# Patient Record
Sex: MALE | Race: WHITE | NOT HISPANIC OR LATINO | Employment: OTHER | URBAN - METROPOLITAN AREA
[De-identification: names, ages, dates, MRNs, and addresses within clinical notes are randomized per-mention and may not be internally consistent; named-entity substitution may affect disease eponyms.]

---

## 2019-02-28 ENCOUNTER — OFFICE VISIT (OUTPATIENT)
Dept: URGENT CARE | Facility: PHYSICIAN GROUP | Age: 44
End: 2019-02-28
Payer: COMMERCIAL

## 2019-02-28 VITALS
SYSTOLIC BLOOD PRESSURE: 128 MMHG | TEMPERATURE: 96.9 F | HEART RATE: 112 BPM | OXYGEN SATURATION: 94 % | WEIGHT: 204 LBS | DIASTOLIC BLOOD PRESSURE: 88 MMHG

## 2019-02-28 DIAGNOSIS — L03.114 CELLULITIS OF LEFT UPPER EXTREMITY: ICD-10-CM

## 2019-02-28 DIAGNOSIS — H60.501 ACUTE OTITIS EXTERNA OF RIGHT EAR, UNSPECIFIED TYPE: ICD-10-CM

## 2019-02-28 PROCEDURE — 99204 OFFICE O/P NEW MOD 45 MIN: CPT | Performed by: FAMILY MEDICINE

## 2019-02-28 RX ORDER — PREGABALIN 300 MG/1
300 CAPSULE ORAL DAILY
COMMUNITY
End: 2021-08-13 | Stop reason: SDUPTHER

## 2019-02-28 RX ORDER — AMOXICILLIN AND CLAVULANATE POTASSIUM 875; 125 MG/1; MG/1
1 TABLET, FILM COATED ORAL 2 TIMES DAILY
Qty: 14 TAB | Refills: 0 | Status: SHIPPED | OUTPATIENT
Start: 2019-02-28 | End: 2019-03-07

## 2019-02-28 RX ORDER — METHADONE HYDROCHLORIDE 10 MG/1
10 TABLET ORAL
COMMUNITY
End: 2021-08-13

## 2019-02-28 ASSESSMENT — ENCOUNTER SYMPTOMS
MYALGIAS: 0
EYE PAIN: 0
NUMBNESS: 0
DIZZINESS: 0
SORE THROAT: 0
TINGLING: 0
COUGH: 0
VERTIGO: 0
CHILLS: 0
FEVER: 0
SHORTNESS OF BREATH: 0
VOMITING: 0
NAUSEA: 0
MUSCLE WEAKNESS: 0

## 2019-02-28 NOTE — PATIENT INSTRUCTIONS
Otalgia  Otalgia is pain in or around the ear. When the pain is from the ear itself it is called primary otalgia. Pain may also be coming from somewhere else, like the head and neck. This is called secondary otalgia.   CAUSES   Causes of primary otalgia include:  · Middle ear infection.  · It can also be caused by injury to the ear or infection of the ear canal (swimmer's ear). Swimmer's ear causes pain, swelling and often drainage from the ear canal.  Causes of secondary otalgia include:  · Sinus infections.  · Allergies and colds that cause stuffiness of the nose and tubes that drain the ears (eustachian tubes).  · Dental problems like cavities, gum infections or teething.  · Sore Throat (tonsillitis and pharyngitis).  · Swollen glands in the neck.  · Infection of the bone behind the ear (mastoiditis).  · TMJ discomfort (problems with the joint between your jaw and your skull).  · Other problems such as nerve disorders, circulation problems, heart disease and tumors of the head and neck can also cause symptoms of ear pain. This is rare.  DIAGNOSIS   Evaluation, Diagnosis and Testing:  · Examination by your medical caregiver is recommended to evaluate and diagnose the cause of otalgia.  · Further testing or referral to a specialist may be indicated if the cause of the ear pain is not found and the symptom persists.  TREATMENT   · Your doctor may prescribe antibiotics if an ear infection is diagnosed.  · Pain relievers and topical analgesics may be recommended.  · It is important to take all medications as prescribed.  HOME CARE INSTRUCTIONS   · It may be helpful to sleep with the painful ear in the up position.  · A warm compress over the painful ear may provide relief.  · A soft diet and avoiding gum may help while ear pain is present.  SEEK IMMEDIATE MEDICAL CARE IF:  · You develop severe pain, a high fever, repeated vomiting or dehydration.  · You develop extreme dizziness, headache, confusion, ringing in the  ears (tinnitus) or hearing loss.  Document Released: 01/25/2006 Document Revised: 03/11/2013 Document Reviewed: 10/27/2010  [employer]® Patient Information ©2014 [employer], [employer].

## 2019-02-28 NOTE — PROGRESS NOTES
Subjective:     Aroldo Joseph is a 43 y.o. male who presents for Wrist Pain ((left) posterior redness and pain, no trauma. ) and Ear Fullness ((right) with bleeding since the day before yesterday. )       Wrist Pain    The incident occurred 3 to 5 days ago. There was no injury mechanism. The pain is present in the left wrist. The quality of the pain is described as aching. The pain does not radiate. The pain is mild. The pain has been constant since the incident. Pertinent negatives include no chest pain, muscle weakness, numbness or tingling.   Ear Fullness   This is a new problem. The current episode started yesterday. The problem occurs constantly. The problem has been waxing and waning. Pertinent negatives include no chest pain, chills, congestion, coughing, fever, myalgias, nausea, numbness, rash, sore throat, vertigo or vomiting.     Past Medical History:   Diagnosis Date   • Spinal cord injury, T7-T12 (HCC)      Past Surgical History:   Procedure Laterality Date   • LAMINOTOMY     • OPEN REDUCTION       Social History     Social History   • Marital status:      Spouse name: N/A   • Number of children: N/A   • Years of education: N/A     Occupational History   • Not on file.     Social History Main Topics   • Smoking status: Never Smoker   • Smokeless tobacco: Current User     Types: Chew   • Alcohol use No   • Drug use: No   • Sexual activity: Not on file     Other Topics Concern   • Not on file     Social History Narrative   • No narrative on file      Family History   Problem Relation Age of Onset   • Stroke Mother    • Stroke Maternal Grandfather    • Heart Disease Neg Hx     Review of Systems   Constitutional: Negative for chills and fever.   HENT: Positive for ear discharge, ear pain and hearing loss. Negative for congestion, nosebleeds and sore throat.    Eyes: Negative for pain.   Respiratory: Negative for cough and shortness of breath.    Cardiovascular: Negative for chest pain.      Gastrointestinal: Negative for nausea and vomiting.   Genitourinary: Negative for hematuria.   Musculoskeletal: Negative for myalgias.   Skin: Negative for rash.   Neurological: Negative for dizziness, vertigo, tingling and numbness.   No Known Allergies   Objective:   /88   Pulse (!) 112   Temp 36.1 °C (96.9 °F)   Wt 92.5 kg (204 lb)   SpO2 94%   Physical Exam   Constitutional: He is oriented to person, place, and time. He appears well-developed and well-nourished. No distress.   HENT:   Head: Normocephalic and atraumatic.   Right Ear: There is drainage, swelling and tenderness.   Eyes: Pupils are equal, round, and reactive to light. Conjunctivae and EOM are normal.   Cardiovascular: Normal rate and regular rhythm.    No murmur heard.  Pulmonary/Chest: Effort normal and breath sounds normal. No respiratory distress.   Abdominal: Soft. He exhibits no distension. There is no tenderness.   Neurological: He is alert and oriented to person, place, and time. He has normal reflexes. No sensory deficit.   Skin: Skin is warm and dry.        Psychiatric: He has a normal mood and affect.         Assessment/Plan:   Assessment    1. Acute otitis externa of right ear, unspecified type  - amoxicillin-clavulanate (AUGMENTIN) 875-125 MG Tab; Take 1 Tab by mouth 2 times a day for 7 days.  Dispense: 14 Tab; Refill: 0  - REFERRAL TO ENT    2. Cellulitis of left upper extremity    Other orders  - pregabalin (LYRICA) 300 MG capsule; Take 300 mg by mouth every day.  - methadone (DOLOPHINE) 10 MG Tab; Take 10 mg by mouth.  - Venlafaxine HCl (EFFEXOR XR PO); Take  by mouth.  - BACLOFEN PO; Take  by mouth.    Differential diagnosis, natural history, supportive care, and indications for immediate follow-up discussed.

## 2021-08-12 ENCOUNTER — TELEPHONE (OUTPATIENT)
Dept: SCHEDULING | Facility: IMAGING CENTER | Age: 46
End: 2021-08-12

## 2021-08-13 ENCOUNTER — OFFICE VISIT (OUTPATIENT)
Dept: MEDICAL GROUP | Facility: PHYSICIAN GROUP | Age: 46
End: 2021-08-13
Payer: COMMERCIAL

## 2021-08-13 VITALS
SYSTOLIC BLOOD PRESSURE: 102 MMHG | HEIGHT: 68 IN | DIASTOLIC BLOOD PRESSURE: 68 MMHG | BODY MASS INDEX: 28.82 KG/M2 | TEMPERATURE: 98.1 F | WEIGHT: 190.2 LBS | HEART RATE: 77 BPM | OXYGEN SATURATION: 95 %

## 2021-08-13 DIAGNOSIS — G56.21 ULNAR NEUROPATHY OF RIGHT UPPER EXTREMITY: ICD-10-CM

## 2021-08-13 DIAGNOSIS — G89.29 OTHER CHRONIC PAIN: ICD-10-CM

## 2021-08-13 DIAGNOSIS — Z87.81 HISTORY OF TIBIAL FRACTURE: ICD-10-CM

## 2021-08-13 DIAGNOSIS — Z13.220 LIPID SCREENING: ICD-10-CM

## 2021-08-13 DIAGNOSIS — R33.9 RETENTION OF URINE: ICD-10-CM

## 2021-08-13 DIAGNOSIS — K62.3 RECTAL PROLAPSE: ICD-10-CM

## 2021-08-13 DIAGNOSIS — M21.372 FOOT DROP, BILATERAL: ICD-10-CM

## 2021-08-13 DIAGNOSIS — Z00.00 HEALTH CARE MAINTENANCE: ICD-10-CM

## 2021-08-13 DIAGNOSIS — N31.9 NEUROGENIC BLADDER: ICD-10-CM

## 2021-08-13 DIAGNOSIS — G83.4 CAUDA EQUINA SYNDROME (HCC): ICD-10-CM

## 2021-08-13 DIAGNOSIS — K59.2 NEUROGENIC BOWEL: ICD-10-CM
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
## 2021-08-13 DIAGNOSIS — F17.210 CIGARETTE NICOTINE DEPENDENCE, UNCOMPLICATED: ICD-10-CM

## 2021-08-13 DIAGNOSIS — M21.371 FOOT DROP, BILATERAL: ICD-10-CM

## 2021-08-13 DIAGNOSIS — S32.018S OTHER CLOSED FRACTURE OF FIRST LUMBAR VERTEBRA, SEQUELA: ICD-10-CM

## 2021-08-13 DIAGNOSIS — Z90.49 S/P CHOLECYSTECTOMY: ICD-10-CM

## 2021-08-13 DIAGNOSIS — Z13.1 DIABETES MELLITUS SCREENING: ICD-10-CM

## 2021-08-13 DIAGNOSIS — M62.838 MUSCLE SPASTICITY: ICD-10-CM

## 2021-08-13 DIAGNOSIS — S24.104A: ICD-10-CM

## 2021-08-13 PROBLEM — Z82.0 FAMILY HISTORY OF HUNTINGTON'S DISEASE: Status: ACTIVE | Noted: 2019-12-06

## 2021-08-13 PROBLEM — K11.7 XEROSTOMIA: Status: ACTIVE | Noted: 2020-02-12

## 2021-08-13 PROBLEM — F33.1 MODERATE EPISODE OF RECURRENT MAJOR DEPRESSIVE DISORDER (HCC): Status: ACTIVE | Noted: 2018-03-22

## 2021-08-13 PROBLEM — S83.511A RUPTURE OF ANTERIOR CRUCIATE LIGAMENT OF RIGHT KNEE: Status: ACTIVE | Noted: 2017-10-25

## 2021-08-13 PROCEDURE — 99215 OFFICE O/P EST HI 40 MIN: CPT | Performed by: INTERNAL MEDICINE

## 2021-08-13 RX ORDER — OXYCODONE AND ACETAMINOPHEN 10; 325 MG/1; MG/1
TABLET ORAL
COMMUNITY
Start: 2021-07-12 | End: 2021-08-13 | Stop reason: SDUPTHER

## 2021-08-13 RX ORDER — FLUOXETINE HYDROCHLORIDE 20 MG/1
20 CAPSULE ORAL
COMMUNITY
Start: 2021-06-13 | End: 2021-09-09 | Stop reason: SDUPTHER

## 2021-08-13 RX ORDER — OXYCODONE AND ACETAMINOPHEN 10; 325 MG/1; MG/1
1 TABLET ORAL
COMMUNITY
Start: 2021-06-09 | End: 2021-08-13

## 2021-08-13 RX ORDER — LISINOPRIL 10 MG/1
10 TABLET ORAL
COMMUNITY
Start: 2021-06-10 | End: 2021-08-13

## 2021-08-13 RX ORDER — METHADONE HYDROCHLORIDE 5 MG/1
5 TABLET ORAL EVERY 8 HOURS PRN
Qty: 60 TABLET | Refills: 0 | Status: SHIPPED | OUTPATIENT
Start: 2021-08-13 | End: 2021-08-13 | Stop reason: SDUPTHER

## 2021-08-13 RX ORDER — GABAPENTIN 300 MG/1
300 CAPSULE ORAL 3 TIMES DAILY
COMMUNITY
End: 2022-01-17

## 2021-08-13 RX ORDER — ALPRAZOLAM 1 MG/1
1 TABLET ORAL
COMMUNITY
Start: 2021-03-10 | End: 2022-01-17

## 2021-08-13 RX ORDER — LISINOPRIL 10 MG/1
10 TABLET ORAL DAILY
COMMUNITY
Start: 2021-06-09 | End: 2021-11-16 | Stop reason: SDUPTHER

## 2021-08-13 RX ORDER — BACLOFEN 10 MG/1
TABLET ORAL
COMMUNITY
Start: 2021-08-09 | End: 2022-01-17

## 2021-08-13 RX ORDER — METHADONE HYDROCHLORIDE 5 MG/1
TABLET ORAL
COMMUNITY
Start: 2021-07-12 | End: 2021-08-13 | Stop reason: SDUPTHER

## 2021-08-13 RX ORDER — DIAZEPAM 10 MG/1
10 TABLET ORAL EVERY 8 HOURS PRN
COMMUNITY
Start: 2021-06-09 | End: 2022-01-17

## 2021-08-13 RX ORDER — METHADONE HYDROCHLORIDE 5 MG/1
5 TABLET ORAL 2 TIMES DAILY PRN
Qty: 60 TABLET | Refills: 0 | Status: SHIPPED | OUTPATIENT
Start: 2021-08-13 | End: 2021-09-09 | Stop reason: SDUPTHER

## 2021-08-13 RX ORDER — OXYCODONE AND ACETAMINOPHEN 10; 325 MG/1; MG/1
TABLET ORAL
Qty: 120 TABLET | Refills: 0 | Status: SHIPPED | OUTPATIENT
Start: 2021-08-13 | End: 2021-09-09 | Stop reason: SDUPTHER

## 2021-08-13 RX ORDER — SODIUM, POTASSIUM,MAG SULFATES 17.5-3.13G
177 SOLUTION, RECONSTITUTED, ORAL ORAL
COMMUNITY
Start: 2021-04-07 | End: 2021-08-13

## 2021-08-13 RX ORDER — PREGABALIN 300 MG/1
300 CAPSULE ORAL 2 TIMES DAILY
Qty: 60 CAPSULE | Refills: 0 | Status: SHIPPED | OUTPATIENT
Start: 2021-08-13 | End: 2021-09-09 | Stop reason: SDUPTHER

## 2021-08-13 RX ORDER — DIAZEPAM 10 MG/1
10 TABLET ORAL
COMMUNITY
Start: 2021-06-09 | End: 2021-08-13

## 2021-08-13 ASSESSMENT — PATIENT HEALTH QUESTIONNAIRE - PHQ9: CLINICAL INTERPRETATION OF PHQ2 SCORE: 0

## 2021-08-13 NOTE — PROGRESS NOTES
New Patient to establish    Chief Complaint   Patient presents with   • Establish Care   • Medication Refill     precocet, methadone, lyrica, prozac       Subjective:     History of Present Illness: Patient is a 45 y.o. male who is here today to establish primary care      5. Cigarette nicotine dependence, uncomplicated  Reported chewing 1 can every 2 days, for 25 to 30 years,    4. Cauda equina syndrome (HCC)  6. Other closed fracture of first lumbar vertebra, sequela  7. Foot drop, bilateral  8. Muscle spasticity  9. Neurogenic bladder  10. Neurogenic bowel  11. Other chronic pain  12. Rectal prolapse  13. Retention of urine  14. History of tibial fracture  15. Ulnar neuropathy of right upper extremity  16. Unspecified injury at t11-T12 level of thoracic spinal cord, initial encounter ([facility])  17. Work related injury  Per chart review report: The patient underwent a  combination of internal and external fixation of the wrists bilaterally, internal fixation of the spine including thoracic and lumbar spine, external and internal fixation of the pelvis, internal fixation of the right tibia.    >> Reported he fell off from ladder 39 feet with injury to multiple part of the body and fractures back in 2001  -Complicated with the above injuries including bowel and bladder incontinence, reported self cath 3 times day as well as evacuation of the rectum by fingers, reported constipation symptom alternating with diarrhea, sometimes he has leaky fecal material from fecal impaction as well, chronic constipation resulted in rectal prolapse  >> He was seen by GI for colonoscopy in [place] as well    >> Patient seen by provider from [facility], work compensation provider, he is receiving Lyrica 300 mg twice daily, oxycodone 10 mg 4 times daily, methadone 5 mg twice daily, gabapentin as needed, diazepam 10 mg to 10 pills in a month alternating with Xanax 1 mg 0 to 10 pills in a month, reported he is not taking both benzo at the  same time, also baclofen 30 mg 3 times daily for muscle spasm    >> Reported he is tired of commuting to his work compensation in [place] and would like to establish with primary care, reported he never had labs for years  -Reported chronic pain, severity score from 4-9, usually elevated by the above opioid and muscle spasm medication, patient is able to work currently and able to perform his ADL S    >> Patient has sequelae from his fracture including left lower extremity spasticity and weakness more than the right side, also left upper extremity spasticity and weakness more than the right side    Current Outpatient Medications on File Prior to Visit   Medication Sig Dispense Refill   • ALPRAZolam (XANAX) 1 MG Tab Take 1 mg by mouth.     • baclofen (LIORESAL) 10 MG Tab      • diazepam (VALIUM) 10 MG tablet Take 10 mg by mouth every 8 hours as needed. ANXIETY     • FLUoxetine (PROZAC) 20 MG Cap Take 20 mg by mouth.     • lisinopril (PRINIVIL) 10 MG Tab Take 10 mg by mouth every day.     • gabapentin (NEURONTIN) 300 MG Cap Take 300 mg by mouth 3 times a day.       No current facility-administered medications on file prior to visit.     Allergies   Allergen Reactions   • Methocarbamol Unspecified     nightmares  nightmares     • Zolpidem Unspecified     nightmars  nightmars       Patient Active Problem List    Diagnosis Date Noted   • History of tibial fracture 08/13/2021   • Xerostomia 02/12/2020   • Family history of [place]'s disease 12/06/2019   • Rectal prolapse 11/30/2019   • Work related injury 11/08/2018   • Moderate episode of recurrent major depressive disorder (HCC) 03/22/2018   • Cigarette nicotine dependence, uncomplicated 11/21/2017   • Rupture of anterior cruciate ligament of right knee 10/25/2017   • UTI (urinary tract infection) 07/24/2014   • Cauda equina syndrome (HCC) 09/21/2012   • Closed fracture of first lumbar vertebra (HCC) 09/21/2012   • Diaphoresis 09/21/2012   • Foot drop, bilateral  "09/21/2012   • Muscle spasticity 09/21/2012   • Neurogenic bladder 09/21/2012   • Neurogenic bowel 09/21/2012   • Osteopenia 09/21/2012   • Other chronic pain 09/21/2012   • Retention of urine 09/21/2012   • Ulnar neuropathy 09/21/2012   • Unspecified injury at t11-T12 level of thoracic spinal cord, initial encounter ([facility]) 09/21/2012     Past Medical History:   Diagnosis Date   • Spinal cord injury, T7-T12 ([facility])    • Tibia fracture 9/21/2012     Past Surgical History:   Procedure Laterality Date   • CHOLECYSTECTOMY     • LAMINOTOMY     • OPEN REDUCTION       Family History   Problem Relation Age of Onset   • Stroke Mother    • Stroke Maternal Grandfather    • Diabetes Father    • Heart Disease Neg Hx      Social History     Tobacco Use   • Smoking status: Never Smoker   • Smokeless tobacco: Current User     Types: Chew   Substance Use Topics   • Alcohol use: No   • Drug use: No     ROS:     - Constitutional: Negative for fever, chills,    - Eye: Negative for blurry vision    -ENT: Negative for ear pain    - Respiratory: Negative for cough, hemoptysis    - Cardiovascular: Negative for chest pain     - Gastrointestinal: Negative for abdominal pain    - Genitourinary: Negative for dysuria    - Musculoskeletal: Negative for joint swelling    - Skin: Negative for itching    - Neurological: Negative for focal weakness     - Psychiatric/Behavioral: Negative for depression        Physical Exam:     /68 (BP Location: Right arm, Patient Position: Sitting, BP Cuff Size: Adult)   Pulse 77   Temp 36.7 °C (98.1 °F) (Temporal)   Ht 1.727 m (5' 8\")   Wt 86.3 kg (190 lb 3.2 oz)   SpO2 95%   BMI 28.92 kg/m²   General: Normal appearing. No distress.  ENT: oropharynx without exudates.    Eyes: conjunctiva clear lids without ptosis  Pulmonary: Clear to ausculation.  Normal effort.   Cardiovascular: Regular rate and rhythm  Abdomen: Soft, nontender,  Lymph: No cervical or supraclavicular palpable lymph nodes  Psych: Normal " mood and affect.     Assessment and Plan:     1. Health care maintenance  4. Cauda equina syndrome (HCC)  6. Other closed fracture of first lumbar vertebra, sequela  7. Foot drop, bilateral  8. Muscle spasticity  9. Neurogenic bladder  10. Neurogenic bowel  11. Other chronic pain  12. Rectal prolapse  13. Retention of urine  14. History of tibial fracture  15. Ulnar neuropathy of right upper extremity  16. Unspecified injury at t11-T12 level of thoracic spinal cord, initial encounter ([facility])  17. Work related injury  - REFERRAL TO OUTPATIENT INTERVENTIONAL PAIN CLINIC  - pregabalin (LYRICA) 300 MG capsule; Take 1 Capsule by mouth 2 times a day for 30 days.  Dispense: 60 Capsule; Refill: 0  - oxyCODONE-acetaminophen (PERCOCET-10)  MG Tab; TAKE ONE OR TWO TABLETS BY MOUTH EVERY FOUR HOURS AS NEEDED FOR SEVERE PAIN FOR 30 DAYS. MAX DAILY AMOUNT 12 TABLETS  Dispense: 120 Tablet; Refill: 0  - methadone (DOLOPHINE) 5 MG Tab; Take 1 Tablet by mouth bid as needed for Moderate Pain for up to 30 days.  Dispense: 60 Tablet; Refill: 0  - CBC WITH DIFFERENTIAL; Future  - Comp Metabolic Panel; Future  - CRP HIGH SENSITIVE (CARDIAC); Future  - MAGNESIUM, RBC  - VITAMIN D,25 HYDROXY; Future  - VITAMIN B12; Future  - TSH WITH REFLEX TO FT4; Future  - THYROID PEROXIDASE  (TPO) AB; Future  - Pain Management Screen; Future  - Controlled Substance Treatment Agreement    >> Not sure how much of constipation contributed to opioid versus a spinal cord injury as well    - Pain Management Screen; Future  - Controlled Substance Treatment Agreement  >>  checked, no risk for abuse, patient understand the risk factors and complication including even death from  overdosing  -Urine drug screen ordered  -ORT score2    2. Diabetes mellitus screening  - HEMOGLOBIN A1C; Future  - INSULIN FASTING; Future    3. Lipid screening  - NMR LIPOPROFILE    Follow Up:      Return in about 4 weeks (around 9/10/2021).  Labs,  Please note that this dictation  was created using voice recognition software. I have made every reasonable attempt to correct obvious errors, but I expect that there are errors of grammar and possibly content that I did not discover before finalizing the note.    Signed by: Guillermo Li M.D.

## 2021-08-13 NOTE — PROGRESS NOTES
The patient underwent a  combination of internal and external fixation of the wrists, internal  fixation of the spine, external and internal fixation of the pelvis,  internal fixation of the right tibia.

## 2021-08-23 ENCOUNTER — HOSPITAL ENCOUNTER (OUTPATIENT)
Dept: LAB | Facility: MEDICAL CENTER | Age: 46
End: 2021-08-23
Attending: INTERNAL MEDICINE
Payer: COMMERCIAL

## 2021-08-23 PROCEDURE — 36415 COLL VENOUS BLD VENIPUNCTURE: CPT

## 2021-08-23 PROCEDURE — 80061 LIPID PANEL: CPT

## 2021-08-23 PROCEDURE — 83735 ASSAY OF MAGNESIUM: CPT

## 2021-08-23 PROCEDURE — 83704 LIPOPROTEIN BLD QUAN PART: CPT

## 2021-08-25 LAB — MAGNESIUM RBC-SCNC: 6.3 MG/DL (ref 3.6–7.5)

## 2021-08-26 LAB
CHOLEST SERPL-MCNC: 221 MG/DL
FASTING STATUS PATIENT QL REPORTED: NORMAL
HDL PARTICAL NO Q4363: 28.1 UMOL/L
HDL SIZE Q4361: 8.4 NM
HDLC SERPL-MCNC: 43 MG/DL (ref 40–59)
HLD.LARGE SERPL-SCNC: <2.8 UMOL/L
L VLDL PART NO Q4357: 2.5 NMOL/L
LDL SERPL QN: 20.8 NM
LDL SERPL-SCNC: 1773 NMOL/L
LDL SMALL SERPL-SCNC: 939 NMOL/L
LDLC SERPL CALC-MCNC: 158 MG/DL
PATHOLOGY STUDY: ABNORMAL
TRIGL SERPL-MCNC: 98 MG/DL (ref 30–149)
VLDL SIZE Q4362: 46.1 NM

## 2021-08-31 ENCOUNTER — OFFICE VISIT (OUTPATIENT)
Dept: PHYSICAL MEDICINE AND REHAB | Facility: REHABILITATION | Age: 46
End: 2021-08-31
Payer: COMMERCIAL

## 2021-08-31 VITALS
RESPIRATION RATE: 18 BRPM | TEMPERATURE: 97.8 F | OXYGEN SATURATION: 95 % | SYSTOLIC BLOOD PRESSURE: 112 MMHG | HEART RATE: 70 BPM | DIASTOLIC BLOOD PRESSURE: 60 MMHG

## 2021-08-31 DIAGNOSIS — M79.2 NEUROPATHIC PAIN: ICD-10-CM

## 2021-08-31 DIAGNOSIS — S32.018S OTHER CLOSED FRACTURE OF FIRST LUMBAR VERTEBRA, SEQUELA: ICD-10-CM

## 2021-08-31 DIAGNOSIS — N31.9 NEUROGENIC BLADDER: ICD-10-CM

## 2021-08-31 DIAGNOSIS — G95.81 CONUS MEDULLARIS SYNDROME (HCC): Primary | ICD-10-CM

## 2021-08-31 DIAGNOSIS — K59.2 NEUROGENIC BOWEL: ICD-10-CM

## 2021-08-31 DIAGNOSIS — G89.29 CHRONIC NOCICEPTIVE PAIN: ICD-10-CM

## 2021-08-31 PROCEDURE — 99204 OFFICE O/P NEW MOD 45 MIN: CPT | Performed by: PHYSICAL MEDICINE & REHABILITATION

## 2021-08-31 NOTE — PROGRESS NOTES
[facility]  [facility]  [address] [place] [address]  Ph: [phone]    NEW PATIENT EVALUATION    Patient Name: Aroldo Joseph   Patient : 1975  Patient Age: 45 y.o.     Examining Physician: Dr. Silvana Angel,     PM&R History to Date - Background Information:  Dates of Admission/Discharge to ARU: 3/15/2005-2005    SUBJECTIVE:   Patient Identification: Aroldo Joseph is a 45 y.o. male with PMH significant for HTN and rehabilitation history significant for traumatic thoracic spinal cord injury / L1 burst fracture s/p fixation  and is presenting to PM&R clinic for a NEW OUTPATIENT evaluation with the following chief complaint/s:    Chief Complaint: SCI    History of Present Illness:   - Records reviewed: Acute rehab discharge summary reviewed in its entirety.  At the time the patient was 29 years old when he suffered his spinal cord injury.  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  He was transported to [facility] for evaluation and treatment.  Had multiple surgical interventions for fractures.  Did have fusion of L1 compression fracture and subsequent spinal cord injury.  Placed in [facility]O. Did develop pressure ulcer and underwent skin flap by Dr. Navarrete at [facility].  Was able to get rehab without walls outpatient.  - Records reviewed: General surgery office visit dated 2021, Dr. Arriaga.  Patient self catheterizes and uses digital stimulation.  Has had some prolapse of the rectum.  - Appears patient has gotten most of his health care needs evaluated and treated through [facility]  - Continues to follow up with [facility] in [place].   - Still getting medications through them.   - Has workmans comp  and working on closing out his case.   - Function: Ambulatory without AD. Gets spasticity and cramping. Actively working 5 hrs per day. Stands and runs drill press. Had bad foot drop on the right. Sometimes catches it on the  ground and trips. Not using AFO now.   - Spasticity: Takes Baclofen 30mg TID. Takes Valium 10mg PRN.   - Neuropathic Pain: On Lyrica 300mg BID + Gabapentin 300mg PRN (uses once in a while at night time)  - Nociceptive Pain: On Methadone 5mg BID for general pain + Percocet  QID.    - Bladder: Self catheterizes. Hasnt seen Urology in years.   - Bowel: Has had loose stools since colonoscopy. Usually does DS BID. Has been taking probiotic.   - Percocet has been reduced over the years as well as the Methadone.     Review of Systems:  All other pertinent positive review of systems are noted above in HPI.   All other systems reviewed and are negative.    Past Medical History:  Past Medical History:   Diagnosis Date   • Tibia fracture 9/21/2012   • Spinal cord injury, T7-T12 (HCC)       Past Surgical History:   Procedure Laterality Date   • CHOLECYSTECTOMY     • LAMINOTOMY     • OPEN REDUCTION          Current Outpatient Medications:   •  ALPRAZolam (XANAX) 1 MG Tab, Take 1 mg by mouth., Disp: , Rfl:   •  baclofen (LIORESAL) 10 MG Tab, , Disp: , Rfl:   •  diazepam (VALIUM) 10 MG tablet, Take 10 mg by mouth every 8 hours as needed. ANXIETY, Disp: , Rfl:   •  FLUoxetine (PROZAC) 20 MG Cap, Take 20 mg by mouth., Disp: , Rfl:   •  lisinopril (PRINIVIL) 10 MG Tab, Take 10 mg by mouth every day., Disp: , Rfl:   •  gabapentin (NEURONTIN) 300 MG Cap, Take 300 mg by mouth 3 times a day., Disp: , Rfl:   •  pregabalin (LYRICA) 300 MG capsule, Take 1 Capsule by mouth 2 times a day for 30 days., Disp: 60 Capsule, Rfl: 0  •  oxyCODONE-acetaminophen (PERCOCET-10)  MG Tab, TAKE ONE OR TWO TABLETS BY MOUTH EVERY FOUR HOURS AS NEEDED FOR SEVERE PAIN FOR 30 DAYS. MAX DAILY AMOUNT 12 TABLETS, Disp: 120 Tablet, Rfl: 0  •  methadone (DOLOPHINE) 5 MG Tab, Take 1 Tablet by mouth 2 times a day as needed for Moderate Pain for up to 30 days., Disp: 60 Tablet, Rfl: 0  Allergies   Allergen Reactions   • Methocarbamol Unspecified      nightmares  nightmares     • Zolpidem Unspecified     nightmars  nightmars          Past Social History:  Social History     Socioeconomic History   • Marital status:      Spouse name: Not on file   • Number of children: Not on file   • Years of education: Not on file   • Highest education level: Not on file   Occupational History   • Not on file   Tobacco Use   • Smoking status: Never Smoker   • Smokeless tobacco: Current User     Types: Chew   Substance and Sexual Activity   • Alcohol use: No   • Drug use: No   • Sexual activity: Not on file   Other Topics Concern   • Not on file   Social History Narrative   • Not on file     Social Determinants of Health     Financial Resource Strain:    • Difficulty of Paying Living Expenses:    Food Insecurity:    • Worried About Running Out of Food in the Last Year:    • Ran Out of Food in the Last Year:    Transportation Needs:    • Lack of Transportation (Medical):    • Lack of Transportation (Non-Medical):    Physical Activity:    • Days of Exercise per Week:    • Minutes of Exercise per Session:    Stress:    • Feeling of Stress :    Social Connections:    • Frequency of Communication with Friends and Family:    • Frequency of Social Gatherings with Friends and Family:    • Attends Mandaen Services:    • Active Member of Clubs or Organizations:    • Attends Club or Organization Meetings:    • Marital Status:    Intimate Partner Violence:    • Fear of Current or Ex-Partner:    • Emotionally Abused:    • Physically Abused:    • Sexually Abused:         Family History:  Family History   Problem Relation Age of Onset   • Stroke Mother    • Stroke Maternal Grandfather    • Diabetes Father    • Heart Disease Neg Hx        Depression and Opioid Screening  PHQ-9:  Depression Screen (PHQ-2/PHQ-9) 8/13/2021   PHQ-2 Total Score 0     Interpretation of PHQ-9 Total Score   Score Severity   1-4 No Depression   5-9 Mild Depression   10-14 Moderate Depression   15-19 Moderately  Severe Depression   20-27 Severe Depression     OBJECTIVE:   Vital Signs:  Vitals:    08/31/21 0811   BP: 112/60   Pulse: 70   Resp: 18   Temp: 36.6 °C (97.8 °F)   SpO2: 95%        Pertinent Labs:  No results found for: SODIUM, POTASSIUM, CHLORIDE, CO2, GLUCOSE, BUN, CREATININE, BUNCREATRAT, GLOMRATE    No results found for: HBA1C    Lab Results   Component Value Date/Time    WBC 6.8 03/22/2006 04:15 AM    RBC 4.04 (L) 03/22/2006 04:15 AM    HEMOGLOBIN 12.2 (L) 03/22/2006 04:15 AM    HEMATOCRIT 33.5 (L) 03/22/2006 04:15 AM    MCV 83.1 03/22/2006 04:15 AM    MCH 30.1 03/22/2006 04:15 AM    MCHC 36.2 (H) 03/22/2006 04:15 AM    MPV 7.8 03/22/2006 04:15 AM       No results found for: ASTSGOT, ALTSGPT     Physical Exam:   GEN: No apparent distress  HEENT: Head normocephalic, atraumatic.  Sclera nonicteric bilaterally, no ocular discharge appreciated bilaterally.  CV: Extremities warm and well-perfused, no peripheral edema appreciated bilaterally.  PULMONARY: Breathing nonlabored on room air, no respiratory accessory muscle use.  Not requiring supplemental oxygen.  SKIN: No appreciable skin breakdown on exposed areas of skin.  PSYCH: Mood and affect within normal limits.  NEURO: Awake alert.  Conversational.  Logical thought content.    Motor Exam Lower Extremities  ? Myotome R L   Hip flexion L2 5 5   Knee extension L3 5 5   Ankle dorsiflexion L4 5 5   Toe extension L5 5 5   Ankle plantarflexion S1 5 5     No significant spasticity on passive modified Haley scale exam.  No clonus bilateral ankles  Mildly spastic gait affecting the right side more than the left.  Ambulatory without assistive device    ASSESSMENT/PLAN: Aroldo Joseph  is a 45 y.o. male with rehabilitation history significant for traumatic thoracic spinal cord injury 2/2 L1 burst fracture s/p fixation 2005 in [place], here for evaluation. The following plan was discussed with the patient who is in agreement.     Visit Diagnoses     ICD-10-CM    1. 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 Other closed fracture of first lumbar vertebra, sequela  S32.018S   3. Neurogenic bladder  N31.9   4. Neurogenic bowel  K59.2   5. Chronic nociceptive pain  G89.29   6. Neuropathic pain  M79.2        Rehab/Neuro:   1. Traumatic thoracic spinal cord injury 2/2 L1 burst fracture s/p fixation 2005 in [place]  2. Conus medullary syndrome (more likely based on clinical presentation) vs DIANA  -Records reviewed as aforementioned in the HPI.  -Therapy: No outpatient therapy required at this time  -Exercise: Recommend self implemented exercise program as tolerated.  -Occupation: Runs a [employer] 5 hours/day, approximately 20-30 hours/week  -Currently working on [employer] case.  Primary treating physician is currently in [place].  -Function: Overall has made a good recovery physically.  Does have some spasticity which is currently managed.  Has been able to return to work.  Ambulatory without assistive device.    Spasticity: Currently well controlled.  -Med management: Continue baclofen 30 mg 3 times daily  -Med management: Continue Valium 10 mg 3 times daily as needed.    Neuropathic Pain: Present, currently controlled.  -Med management: Continue Lyrica 300 mg twice daily  -Med management: Continue gabapentin 300 mg as needed (patient usually takes 300-600 mg at night if has additional pain)    Neurogenic Bladder: Managed with [facility].  Gets 2-3 UTIs per year.  -Imaging: RBUS for renal/bladder health in setting of chronic neurogenic bladder and intermittent catheterization  -Labs: Patient already has CMP ordered by PCP which for whatever reason was not drawn when he went to draw his labs 8/25/2021.  Counseled to follow-up with lab as the orders are there and active.  -High risk management: Inappropriate management of neurogenic bladder can result in hydroureter/hydronephrosis, renal failure, and death.    Neurogenic Bowel: Managed with digital stimulation twice  daily.  Recently had colonoscopy which has caused him to have some persistent diarrhea.  Otherwise bowels have been managed stably with digital stimulation twice daily.  Has rectal prolapse.  Seeing gen surg, Dr. Mac.  No surgical intervention at this time.  -Continue bowel program as is.  -Counseled to continue probiotic for now and try adding fiber to diet temporarily to bulk in stool to prevent incontinence from loose stools.    Endo: Would benefit from vitamin D screen.  -Labs: Vitamin D ordered by PCP which has not yet been drawn.  Patient to follow-up with lab.    Mood:  -Med management: Continue Prozac 20 mg daily.    Sleep: Sometimes impeded by neuropathic pain and nociceptive pain adequately managed by current [place]ian regimen.    Nociceptive Pain: Multiple orthopedic issues during the time of his injury in 2005.  -Med management: Currently on methadone 5 mg twice daily (has been weaned over the years significantly)  -Med management: Continue Percocet 10 mg 4 times daily.  -The patient has been very stable on the aforementioned medication management regimen for a long time.  Does not wish to change his medications in any way.  -Referral: Pain management for evaluation of taking over patient's prescriptions given that he lives in [place] and does not want to have to drive to [place] monthly given that his Workmans comp case will hopefully soon be closed.      Follow up: 2-month follow-up for imaging, pain management referral    Thank you to the referring practitioner for the ability to assist with co-management of this patient. If there are any questions or concerns, please do not hesitate to contact me.     Total time spent was 45 minutes.  Included in this time is the time spent preparing for the visit including record review, my exam and evaluation, counseling and education regarding that which is aforementioned in the assessment and plan. Time was spent ordering the appropriate labs, tests, procedures,  referrals, medications. Discussion involved the patient.     Please note that this dictation was created using voice recognition software. I have made every reasonable attempt to correct obvious errors but there may be errors of grammar and content that I may have overlooked prior to finalization of this note.    Dr. Silvana Angel DO, MS  Department of Physical Medicine & Rehabilitation  Neuro Rehabilitation Clinic  [facility]

## 2021-09-09 ENCOUNTER — OFFICE VISIT (OUTPATIENT)
Dept: MEDICAL GROUP | Facility: PHYSICIAN GROUP | Age: 46
End: 2021-09-09
Payer: COMMERCIAL

## 2021-09-09 VITALS
DIASTOLIC BLOOD PRESSURE: 74 MMHG | HEART RATE: 86 BPM | TEMPERATURE: 96.8 F | SYSTOLIC BLOOD PRESSURE: 118 MMHG | OXYGEN SATURATION: 95 % | HEIGHT: 68 IN | WEIGHT: 187.9 LBS | BODY MASS INDEX: 28.48 KG/M2

## 2021-09-09 DIAGNOSIS — M62.838 MUSCLE SPASTICITY: ICD-10-CM

## 2021-09-09 DIAGNOSIS — Z00.00 HEALTH CARE MAINTENANCE: ICD-10-CM

## 2021-09-09 DIAGNOSIS — K59.2 NEUROGENIC BOWEL: ICD-10-CM

## 2021-09-09 DIAGNOSIS — F33.1 MODERATE EPISODE OF RECURRENT MAJOR DEPRESSIVE DISORDER (HCC): ICD-10-CM
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
## 2021-09-09 DIAGNOSIS — G56.21 ULNAR NEUROPATHY OF RIGHT UPPER EXTREMITY: ICD-10-CM

## 2021-09-09 DIAGNOSIS — G83.4 CAUDA EQUINA SYNDROME (HCC): ICD-10-CM

## 2021-09-09 DIAGNOSIS — Z87.81 HISTORY OF TIBIAL FRACTURE: ICD-10-CM

## 2021-09-09 DIAGNOSIS — S32.018S OTHER CLOSED FRACTURE OF FIRST LUMBAR VERTEBRA, SEQUELA: ICD-10-CM

## 2021-09-09 DIAGNOSIS — Z82.0 FAMILY HISTORY OF HUNTINGTON'S DISEASE: ICD-10-CM

## 2021-09-09 DIAGNOSIS — N39.0 URINARY TRACT INFECTION WITHOUT HEMATURIA, SITE UNSPECIFIED: ICD-10-CM

## 2021-09-09 DIAGNOSIS — M21.371 FOOT DROP, BILATERAL: ICD-10-CM

## 2021-09-09 DIAGNOSIS — S24.104A: ICD-10-CM

## 2021-09-09 DIAGNOSIS — M21.372 FOOT DROP, BILATERAL: ICD-10-CM

## 2021-09-09 DIAGNOSIS — K62.3 RECTAL PROLAPSE: ICD-10-CM

## 2021-09-09 DIAGNOSIS — R33.9 RETENTION OF URINE: ICD-10-CM

## 2021-09-09 DIAGNOSIS — G89.29 OTHER CHRONIC PAIN: ICD-10-CM

## 2021-09-09 DIAGNOSIS — N31.9 NEUROGENIC BLADDER: ICD-10-CM

## 2021-09-09 DIAGNOSIS — F17.210 CIGARETTE NICOTINE DEPENDENCE, UNCOMPLICATED: ICD-10-CM

## 2021-09-09 PROCEDURE — 99214 OFFICE O/P EST MOD 30 MIN: CPT | Performed by: INTERNAL MEDICINE

## 2021-09-09 RX ORDER — OXYCODONE AND ACETAMINOPHEN 10; 325 MG/1; MG/1
TABLET ORAL
Qty: 120 TABLET | Refills: 0 | Status: SHIPPED | OUTPATIENT
Start: 2021-09-12 | End: 2021-10-10

## 2021-09-09 RX ORDER — FLUOXETINE HYDROCHLORIDE 20 MG/1
20 CAPSULE ORAL DAILY
Qty: 90 CAPSULE | Refills: 1 | Status: SHIPPED | OUTPATIENT
Start: 2021-09-09 | End: 2021-11-16 | Stop reason: SDUPTHER

## 2021-09-09 RX ORDER — PREGABALIN 300 MG/1
300 CAPSULE ORAL 2 TIMES DAILY
Qty: 60 CAPSULE | Refills: 0 | Status: SHIPPED | OUTPATIENT
Start: 2021-09-12 | End: 2021-10-12

## 2021-09-09 RX ORDER — METHADONE HYDROCHLORIDE 5 MG/1
5 TABLET ORAL 2 TIMES DAILY PRN
Qty: 60 TABLET | Refills: 0 | Status: SHIPPED | OUTPATIENT
Start: 2021-09-12 | End: 2021-10-12

## 2021-09-09 NOTE — PROGRESS NOTES
Established Patient    Chief Complaint   Patient presents with   • Follow-Up   • Medication Refill       Subjective:     HPI:     >> Patient was seen by physiatrist who recommended to be seen by pain management for refilling methadone as well as other opiate and pain medication such as Lyrica and benzo    >> Also physiatrist ordered ultrasound renal for checking bladder/kidney secondary to chronic urine incontinence    >> Patient lab only his cholesterol profile other labs have not done secondary to not providing papers for labs      Patient Active Problem List    Diagnosis Date Noted   • History of tibial fracture 08/13/2021   • S/P cholecystectomy 08/13/2021   • Xerostomia 02/12/2020   • Family history of Micaela's disease 12/06/2019   • Rectal prolapse 11/30/2019   • Work related injury 11/08/2018   • Moderate episode of recurrent major depressive disorder ([facility]) 03/22/2018   • Cigarette nicotine dependence, uncomplicated 11/21/2017   • Rupture of anterior cruciate ligament of right knee 10/25/2017   • UTI (urinary tract infection) 07/24/2014   • Cauda equina syndrome ([facility]) 09/21/2012   • Closed fracture of first lumbar vertebra ([facility]) 09/21/2012   • Diaphoresis 09/21/2012   • Foot drop, bilateral 09/21/2012   • Muscle spasticity 09/21/2012   • Neurogenic bladder 09/21/2012   • Neurogenic bowel 09/21/2012   • Osteopenia 09/21/2012   • Other chronic pain 09/21/2012   • Retention of urine 09/21/2012   • Ulnar neuropathy 09/21/2012   • Unspecified injury at t11-T12 level of thoracic spinal cord, initial encounter ([facility]) 09/21/2012       Current Outpatient Medications on File Prior to Visit   Medication Sig Dispense Refill   • ALPRAZolam (XANAX) 1 MG Tab Take 1 mg by mouth.     • baclofen (LIORESAL) 10 MG Tab      • diazepam (VALIUM) 10 MG tablet Take 10 mg by mouth every 8 hours as needed. ANXIETY     • lisinopril (PRINIVIL) 10 MG Tab Take 10 mg by mouth every day.     • gabapentin (NEURONTIN) 300 MG Cap Take 300 mg  "by mouth 3 times a day.       No current facility-administered medications on file prior to visit.       Allergies, past medical history, past surgical history, family history, social history reviewed and updated    ROS:  All other systems reviewed and are negative except as stated in the HPI     Physical Exam:     /74 (BP Location: Right arm, Patient Position: Sitting, BP Cuff Size: Adult)   Pulse 86   Temp 36 °C (96.8 °F) (Temporal)   Ht 1.727 m (5' 8\")   Wt 85.2 kg (187 lb 14.4 oz)   SpO2 95%   BMI 28.57 kg/m²   General: Normal appearing. No distress.  ENT: oropharynx without exudates.    Eyes: conjunctiva clear lids without ptosis  Pulmonary: Clear to ausculation.  Normal effort.   Cardiovascular: Regular rate and rhythm  Abdomen: Soft, nontender,  Lymph: No cervical or supraclavicular palpable lymph nodes  Psych: Normal mood and affect.     I have reviewed pertinent labs and diagnostic tests associated with this visit with specific comments listed under the assessment and plan below      Assessment and Plan:     45 y.o. male with the following issues.    1. Health care maintenance  4. Cauda equina syndrome (HCC)  6. Other closed fracture of first lumbar vertebra, sequela  7. Foot drop, bilateral  8. Muscle spasticity  9. Neurogenic bladder  10. Neurogenic bowel  11. Other chronic pain  12. Rectal prolapse  13. Retention of urine  14. History of tibial fracture  15. Ulnar neuropathy of right upper extremity  16. Unspecified injury at t11-T12 level of thoracic spinal cord, initial encounter ([facility])  17. Work related injury  - methadone (DOLOPHINE) 5 MG Tab; Take 1 Tablet by mouth 2 times a day as needed for Moderate Pain for up to 30 days.  Dispense: 60 Tablet; Refill: 0  - oxyCODONE-acetaminophen (PERCOCET-10)  MG Tab; TAKE ONE OR TWO TABLETS BY MOUTH EVERY FOUR HOURS AS NEEDED FOR SEVERE PAIN FOR 30 DAYS. MAX DAILY AMOUNT 12 TABLETS  Dispense: 120 Tablet; Refill: 0  - pregabalin (LYRICA) 300 MG " capsule; Take 1 Capsule by mouth 2 times a day for 30 days.  Dispense: 60 Capsule; Refill: 0    >> Heavily educated and advised to be seen by pain management to refill methadone and other opiate next time when he need for refill.  Patient will call the pain management and make an appointment with them    >> Also advised to complete the rest of the labs and follow-up with me in 2-week    Follow Up:      Return in about 2 weeks (around 9/23/2021).  Labs which are pending  Please note that this dictation was created using voice recognition software. I have made every reasonable attempt to correct obvious errors, but I expect that there are errors of grammar and possibly content that I did not discover before finalizing the note.    Signed by: Guillermo Li M.D.

## 2021-09-24 ENCOUNTER — HOSPITAL ENCOUNTER (OUTPATIENT)
Dept: LAB | Facility: MEDICAL CENTER | Age: 46
End: 2021-09-24
Attending: INTERNAL MEDICINE
Payer: COMMERCIAL

## 2021-09-24 DIAGNOSIS — Z87.81 HISTORY OF TIBIAL FRACTURE: ICD-10-CM

## 2021-09-24 DIAGNOSIS — M21.372 FOOT DROP, BILATERAL: ICD-10-CM

## 2021-09-24 DIAGNOSIS — G56.21 ULNAR NEUROPATHY OF RIGHT UPPER EXTREMITY: ICD-10-CM

## 2021-09-24 DIAGNOSIS — M21.371 FOOT DROP, BILATERAL: ICD-10-CM

## 2021-09-24 DIAGNOSIS — R33.9 RETENTION OF URINE: ICD-10-CM

## 2021-09-24 DIAGNOSIS — F17.210 CIGARETTE NICOTINE DEPENDENCE, UNCOMPLICATED: ICD-10-CM

## 2021-09-24 DIAGNOSIS — G83.4 CAUDA EQUINA SYNDROME (HCC): ICD-10-CM

## 2021-09-24 DIAGNOSIS — K62.3 RECTAL PROLAPSE: ICD-10-CM

## 2021-09-24 DIAGNOSIS — N31.9 NEUROGENIC BLADDER: ICD-10-CM

## 2021-09-24 DIAGNOSIS — M62.838 MUSCLE SPASTICITY: ICD-10-CM

## 2021-09-24 DIAGNOSIS — Z13.1 DIABETES MELLITUS SCREENING: ICD-10-CM

## 2021-09-24 DIAGNOSIS — G89.29 OTHER CHRONIC PAIN: ICD-10-CM

## 2021-09-24 DIAGNOSIS — Z00.00 HEALTH CARE MAINTENANCE: ICD-10-CM
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
## 2021-09-24 DIAGNOSIS — S24.104A: ICD-10-CM

## 2021-09-24 DIAGNOSIS — K59.2 NEUROGENIC BOWEL: ICD-10-CM

## 2021-09-24 DIAGNOSIS — S32.018S OTHER CLOSED FRACTURE OF FIRST LUMBAR VERTEBRA, SEQUELA: ICD-10-CM

## 2021-09-24 LAB
25(OH)D3 SERPL-MCNC: 23 NG/ML (ref 30–100)
ALBUMIN SERPL BCP-MCNC: 4.6 G/DL (ref 3.2–4.9)
ALBUMIN/GLOB SERPL: 1.6 G/DL
ALP SERPL-CCNC: 86 U/L (ref 30–99)
ALT SERPL-CCNC: 19 U/L (ref 2–50)
ANION GAP SERPL CALC-SCNC: 9 MMOL/L (ref 7–16)
AST SERPL-CCNC: 22 U/L (ref 12–45)
BASOPHILS # BLD AUTO: 0.9 % (ref 0–1.8)
BASOPHILS # BLD: 0.04 K/UL (ref 0–0.12)
BILIRUB SERPL-MCNC: 0.4 MG/DL (ref 0.1–1.5)
BUN SERPL-MCNC: 24 MG/DL (ref 8–22)
CALCIUM SERPL-MCNC: 9.6 MG/DL (ref 8.5–10.5)
CHLORIDE SERPL-SCNC: 107 MMOL/L (ref 96–112)
CO2 SERPL-SCNC: 21 MMOL/L (ref 20–33)
CREAT SERPL-MCNC: 0.94 MG/DL (ref 0.5–1.4)
CRP SERPL HS-MCNC: 6.4 MG/L (ref 0–3)
EOSINOPHIL # BLD AUTO: 0.04 K/UL (ref 0–0.51)
EOSINOPHIL NFR BLD: 0.9 % (ref 0–6.9)
ERYTHROCYTE [DISTWIDTH] IN BLOOD BY AUTOMATED COUNT: 45.6 FL (ref 35.9–50)
EST. AVERAGE GLUCOSE BLD GHB EST-MCNC: 111 MG/DL
GLOBULIN SER CALC-MCNC: 2.8 G/DL (ref 1.9–3.5)
GLUCOSE SERPL-MCNC: 107 MG/DL (ref 65–99)
HBA1C MFR BLD: 5.5 % (ref 4–5.6)
HCT VFR BLD AUTO: 41 % (ref 42–52)
HGB BLD-MCNC: 13.6 G/DL (ref 14–18)
IMM GRANULOCYTES # BLD AUTO: 0.01 K/UL (ref 0–0.11)
IMM GRANULOCYTES NFR BLD AUTO: 0.2 % (ref 0–0.9)
LYMPHOCYTES # BLD AUTO: 1.39 K/UL (ref 1–4.8)
LYMPHOCYTES NFR BLD: 30.3 % (ref 22–41)
MCH RBC QN AUTO: 28.9 PG (ref 27–33)
MCHC RBC AUTO-ENTMCNC: 33.2 G/DL (ref 33.7–35.3)
MCV RBC AUTO: 87.2 FL (ref 81.4–97.8)
MONOCYTES # BLD AUTO: 0.37 K/UL (ref 0–0.85)
MONOCYTES NFR BLD AUTO: 8.1 % (ref 0–13.4)
NEUTROPHILS # BLD AUTO: 2.74 K/UL (ref 1.82–7.42)
NEUTROPHILS NFR BLD: 59.6 % (ref 44–72)
NRBC # BLD AUTO: 0 K/UL
NRBC BLD-RTO: 0 /100 WBC
PLATELET # BLD AUTO: 238 K/UL (ref 164–446)
PMV BLD AUTO: 11.6 FL (ref 9–12.9)
POTASSIUM SERPL-SCNC: 4.3 MMOL/L (ref 3.6–5.5)
PROT SERPL-MCNC: 7.4 G/DL (ref 6–8.2)
RBC # BLD AUTO: 4.7 M/UL (ref 4.7–6.1)
SODIUM SERPL-SCNC: 137 MMOL/L (ref 135–145)
THYROPEROXIDASE AB SERPL-ACNC: <9 IU/ML (ref 0–9)
TSH SERPL DL<=0.005 MIU/L-ACNC: 1.16 UIU/ML (ref 0.38–5.33)
VIT B12 SERPL-MCNC: 331 PG/ML (ref 211–911)
WBC # BLD AUTO: 4.6 K/UL (ref 4.8–10.8)

## 2021-09-24 PROCEDURE — 86141 C-REACTIVE PROTEIN HS: CPT

## 2021-09-24 PROCEDURE — 80061 LIPID PANEL: CPT

## 2021-09-24 PROCEDURE — 83525 ASSAY OF INSULIN: CPT

## 2021-09-24 PROCEDURE — 80053 COMPREHEN METABOLIC PANEL: CPT

## 2021-09-24 PROCEDURE — 83036 HEMOGLOBIN GLYCOSYLATED A1C: CPT

## 2021-09-24 PROCEDURE — 82607 VITAMIN B-12: CPT

## 2021-09-24 PROCEDURE — 83735 ASSAY OF MAGNESIUM: CPT

## 2021-09-24 PROCEDURE — 84443 ASSAY THYROID STIM HORMONE: CPT

## 2021-09-24 PROCEDURE — 86376 MICROSOMAL ANTIBODY EACH: CPT

## 2021-09-24 PROCEDURE — 82306 VITAMIN D 25 HYDROXY: CPT

## 2021-09-24 PROCEDURE — 36415 COLL VENOUS BLD VENIPUNCTURE: CPT

## 2021-09-24 PROCEDURE — 85025 COMPLETE CBC W/AUTO DIFF WBC: CPT

## 2021-09-24 PROCEDURE — 83704 LIPOPROTEIN BLD QUAN PART: CPT

## 2021-09-26 LAB — INSULIN P FAST SERPL-ACNC: 6 UIU/ML (ref 3–25)

## 2021-09-28 LAB
CHOLEST SERPL-MCNC: 189 MG/DL
HDL PARTICAL NO Q4363: 30.5 UMOL/L
HDL SIZE Q4361: 8.1 NM
HDLC SERPL-MCNC: 37 MG/DL (ref 40–59)
HLD.LARGE SERPL-SCNC: <2.8 UMOL/L
L VLDL PART NO Q4357: 1.6 NMOL/L
LDL SERPL QN: 20.8 NM
LDL SERPL-SCNC: 1753 NMOL/L
LDL SMALL SERPL-SCNC: 827 NMOL/L
LDLC SERPL CALC-MCNC: 128 MG/DL
MAGNESIUM RBC-SCNC: 6.6 MG/DL (ref 3.6–7.5)
PATHOLOGY STUDY: ABNORMAL
TRIGL SERPL-MCNC: 120 MG/DL (ref 30–149)
VLDL SIZE Q4362: 43 NM

## 2021-11-15 ENCOUNTER — OFFICE VISIT (OUTPATIENT)
Dept: PHYSICAL MEDICINE AND REHAB | Facility: REHABILITATION | Age: 46
End: 2021-11-15
Payer: COMMERCIAL

## 2021-11-15 VITALS
DIASTOLIC BLOOD PRESSURE: 58 MMHG | BODY MASS INDEX: 30.31 KG/M2 | HEIGHT: 68 IN | SYSTOLIC BLOOD PRESSURE: 124 MMHG | TEMPERATURE: 97.7 F | HEART RATE: 80 BPM | RESPIRATION RATE: 15 BRPM | OXYGEN SATURATION: 97 % | WEIGHT: 200 LBS

## 2021-11-15 DIAGNOSIS — G95.81 CONUS MEDULLARIS SYNDROME (HCC): Primary | ICD-10-CM

## 2021-11-15 DIAGNOSIS — M79.2 NEUROPATHIC PAIN: ICD-10-CM

## 2021-11-15 DIAGNOSIS — G89.29 CHRONIC NOCICEPTIVE PAIN: ICD-10-CM

## 2021-11-15 DIAGNOSIS — N31.9 NEUROGENIC BLADDER: ICD-10-CM

## 2021-11-15 DIAGNOSIS — K59.2 NEUROGENIC BOWEL: ICD-10-CM

## 2021-11-15 PROCEDURE — 99214 OFFICE O/P EST MOD 30 MIN: CPT | Performed by: PHYSICAL MEDICINE & REHABILITATION

## 2021-11-15 ASSESSMENT — FIBROSIS 4 INDEX: FIB4 SCORE: 0.93

## 2021-11-15 NOTE — PROGRESS NOTES
[facility]  [facility]  [address] [place] [address]  Ph: [phone]    FOLLOW UP PATIENT EVALUATION    Patient Name: Aroldo Joseph   Patient : 1975  Patient Age: 45 y.o.     Examining Physician: Dr. Silvana Angel, DO    PM&R History to Date - Background Information:  Dates of Admission/Discharge to ARU: 3/15/2005-2005    SUBJECTIVE:   Patient Identification: Aroldo Joseph is a 45 y.o. male with PMH significant for HTN and rehabilitation history significant for traumatic thoracic spinal cord injury  L1 burst fracture s/p fixation  and is presenting to PM&R clinic for a FOLLOW UP OUTPATIENT evaluation with the following chief complaint/s:    Chief Complaint: SCI follow up    History of Present Illness:   - Has established with Pain Management. Had patch initially, but now on oral agent.   - No longer on Methadone which he now realizes helped him a lot.   - Likes to get into hot tub at night so the patch didn't work for him. Additionally, works in water and sweats a lot.   - Pain psychologist was recommended.   - Will see new PCP tomorrow morning.   - Bladder: CIC 2x per day. Labs reviewed and BUN mildly elevated at 24.   - Bowel: Every since colonoscopy has had diarrhea. Tried Probiotic 4-5 days and did not help.     Review of Systems:  All other pertinent positive review of systems are noted above in HPI.   All other systems reviewed and are negative.    Past Medical History:  Past Medical History:   Diagnosis Date   • Tibia fracture 2012   • Spinal cord injury, T7-T12 (HCC)       Past Surgical History:   Procedure Laterality Date   • CHOLECYSTECTOMY     • LAMINOTOMY     • OPEN REDUCTION          Current Outpatient Medications:   •  FLUoxetine (PROZAC) 20 MG Cap, Take 1 Capsule by mouth every day., Disp: 90 Capsule, Rfl: 1  •  ALPRAZolam (XANAX) 1 MG Tab, Take 1 mg by mouth., Disp: , Rfl:   •  baclofen (LIORESAL) 10 MG Tab, , Disp: , Rfl:    •  diazepam (VALIUM) 10 MG tablet, Take 10 mg by mouth every 8 hours as needed. ANXIETY, Disp: , Rfl:   •  lisinopril (PRINIVIL) 10 MG Tab, Take 10 mg by mouth every day., Disp: , Rfl:   •  gabapentin (NEURONTIN) 300 MG Cap, Take 300 mg by mouth 3 times a day., Disp: , Rfl:   Allergies   Allergen Reactions   • Methocarbamol Unspecified     nightmares  nightmares     • Zolpidem Unspecified     nightmars  nightmars          Past Social History:  Social History     Socioeconomic History   • Marital status:      Spouse name: Not on file   • Number of children: Not on file   • Years of education: Not on file   • Highest education level: Not on file   Occupational History   • Not on file   Tobacco Use   • Smoking status: Never Smoker   • Smokeless tobacco: Current User     Types: Chew   Substance and Sexual Activity   • Alcohol use: No   • Drug use: No   • Sexual activity: Not on file   Other Topics Concern   • Not on file   Social History Narrative   • Not on file     Social Determinants of Health     Financial Resource Strain:    • Difficulty of Paying Living Expenses: Not on file   Food Insecurity:    • Worried About Running Out of Food in the Last Year: Not on file   • Ran Out of Food in the Last Year: Not on file   Transportation Needs:    • Lack of Transportation (Medical): Not on file   • Lack of Transportation (Non-Medical): Not on file   Physical Activity:    • Days of Exercise per Week: Not on file   • Minutes of Exercise per Session: Not on file   Stress:    • Feeling of Stress : Not on file   Social Connections:    • Frequency of Communication with Friends and Family: Not on file   • Frequency of Social Gatherings with Friends and Family: Not on file   • Attends Restorationist Services: Not on file   • Active Member of Clubs or Organizations: Not on file   • Attends Club or Organization Meetings: Not on file   • Marital Status: Not on file   Intimate Partner Violence:    • Fear of Current or Ex-Partner:  Not on file   • Emotionally Abused: Not on file   • Physically Abused: Not on file   • Sexually Abused: Not on file   Housing Stability:    • Unable to Pay for Housing in the Last Year: Not on file   • Number of Places Lived in the Last Year: Not on file   • Unstable Housing in the Last Year: Not on file        Family History:  Family History   Problem Relation Age of Onset   • Stroke Mother    • Stroke Maternal Grandfather    • Diabetes Father    • Heart Disease Neg Hx        Depression and Opioid Screening  PHQ-9:  Depression Screen (PHQ-2/PHQ-9) 8/13/2021   PHQ-2 Total Score 0     Interpretation of PHQ-9 Total Score   Score Severity   1-4 No Depression   5-9 Mild Depression   10-14 Moderate Depression   15-19 Moderately Severe Depression   20-27 Severe Depression     OBJECTIVE:   Vital Signs:  Vitals:    11/15/21 1409   BP: 124/58   Pulse: 80   Resp: 15   Temp: 36.5 °C (97.7 °F)   SpO2: 97%        Pertinent Labs:  Lab Results   Component Value Date/Time    SODIUM 137 09/24/2021 09:06 AM    POTASSIUM 4.3 09/24/2021 09:06 AM    CHLORIDE 107 09/24/2021 09:06 AM    CO2 21 09/24/2021 09:06 AM    GLUCOSE 107 (H) 09/24/2021 09:06 AM    BUN 24 (H) 09/24/2021 09:06 AM    CREATININE 0.94 09/24/2021 09:06 AM       Lab Results   Component Value Date/Time    HBA1C 5.5 09/24/2021 09:06 AM       Lab Results   Component Value Date/Time    WBC 4.6 (L) 09/24/2021 09:06 AM    RBC 4.70 09/24/2021 09:06 AM    HEMOGLOBIN 13.6 (L) 09/24/2021 09:06 AM    HEMATOCRIT 41.0 (L) 09/24/2021 09:06 AM    MCV 87.2 09/24/2021 09:06 AM    MCH 28.9 09/24/2021 09:06 AM    MCHC 33.2 (L) 09/24/2021 09:06 AM    MPV 11.6 09/24/2021 09:06 AM    NEUTSPOLYS 59.60 09/24/2021 09:06 AM    LYMPHOCYTES 30.30 09/24/2021 09:06 AM    MONOCYTES 8.10 09/24/2021 09:06 AM    EOSINOPHILS 0.90 09/24/2021 09:06 AM    BASOPHILS 0.90 09/24/2021 09:06 AM       Lab Results   Component Value Date/Time    ASTSGOT 22 09/24/2021 09:06 AM    ALTSGPT 19 09/24/2021 09:06 AM         Physical Exam:   GEN: No apparent distress  HEENT: Head normocephalic, atraumatic.  Sclera nonicteric bilaterally, no ocular discharge appreciated bilaterally.  CV: Extremities warm and well-perfused, no peripheral edema appreciated bilaterally.  PULMONARY: Breathing nonlabored on room air, no respiratory accessory muscle use.  Not requiring supplemental oxygen.  SKIN: No appreciable skin breakdown on exposed areas of skin.  PSYCH: Mood and affect within normal limits.  NEURO: Awake alert.  Conversational.  Logical thought content.  Answers questions appropriately.  Ambulatory without assistive device.    ASSESSMENT/PLAN: Aroldo Joseph  is a 45 y.o. male with rehabilitation history significant for traumatic thoracic spinal cord injury 2/2 L1 burst fracture s/p fixation 2005 in [place], here for evaluation. The following plan was discussed with the patient who is in agreement.     Visit Diagnoses     ICD-10-CM   1. 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 Neurogenic bladder  N31.9   3. Neurogenic bowel  K59.2   4. Chronic nociceptive pain  G89.29   5. Neuropathic pain  M79.2        Rehab/Neuro:   1. Traumatic thoracic spinal cord injury 2/2 L1 burst fracture s/p fixation 2005 in [place]  2. 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  Does have some spasticity which is currently managed.  Has been able to return to work.  Ambulatory without assistive device.  -Neuro status: Stable.    Spasticity: Currently controlled.    -Stable on baclofen 30 mg 3 times daily and Valium 10 mg 3 times daily as needed.    Neuropathic Pain: Currently controlled.  -Med management: Continue Lyrica 300 mg twice daily + gabapentin 3-600 mg at night as needed (uses approximately 2 times per month).    Neurogenic Bladder: Manages with clean intermittent  catheterization.  2 times per day.  -Imaging: Reminded to have ultrasound done of bladder and kidneys.  -Labs reviewed: CMP showing mildly elevated BUN at 24.  Creatinine 0.94.  -High risk management: Inappropriate management of neurogenic bladder can result in hydroureter/hydronephrosis, renal failure, and death.    Neurogenic Bowel: Manages with digital stimulation alone.  Has had very sensitive bowel since colonoscopy.  -Counseled on using probiotic for at least 1 to 2 months    Endo: Labs reviewed Vitamin D 9/2021 24  -Med management: Recommend Vitamin D supplementation. Counseled on r/o osteopenia/osteoporosis.     Mood:  -Med management: Continue Prozac 20 mg daily.    Sleep: Sometimes impeded by neuropathic pain and nociceptive pain adequately managed by current Acacian regimen.    Nociceptive Pain: Multiple orthopedic issues during the time of his injury in 2005.  -Med management: Had been stable on methadone 5 mg twice daily  -Med management: Had been stable on Percocet 10 mg 4 times daily.  -Referred to pain management, currently working out medication prescriptions.      Follow up: As needed    Total time spent was 33 minutes.  Included in this time is the time spent preparing for the visit including record review, my exam and evaluation, counseling and education regarding that which is aforementioned in the assessment and plan. Discussion involved the patient.     Please note that this dictation was created using voice recognition software. I have made every reasonable attempt to correct obvious errors but there may be errors of grammar and content that I may have overlooked prior to finalization of this note.    Dr. Silvana Angel DO, MS  Department of Physical Medicine & Rehabilitation  Neuro Rehabilitation Clinic  [facility]

## 2021-11-16 ENCOUNTER — OFFICE VISIT (OUTPATIENT)
Dept: MEDICAL GROUP | Age: 46
End: 2021-11-16
Payer: COMMERCIAL

## 2021-11-16 VITALS
BODY MASS INDEX: 29.16 KG/M2 | WEIGHT: 192.4 LBS | SYSTOLIC BLOOD PRESSURE: 126 MMHG | TEMPERATURE: 98.7 F | HEIGHT: 68 IN | OXYGEN SATURATION: 97 % | DIASTOLIC BLOOD PRESSURE: 58 MMHG | HEART RATE: 78 BPM

## 2021-11-16 DIAGNOSIS — K59.2 NEUROGENIC BOWEL: ICD-10-CM

## 2021-11-16 DIAGNOSIS — I10 ESSENTIAL HYPERTENSION: ICD-10-CM

## 2021-11-16 DIAGNOSIS — K62.3 RECTAL PROLAPSE: ICD-10-CM

## 2021-11-16 DIAGNOSIS — Z82.0 FAMILY HISTORY OF HUNTINGTON'S DISEASE: ICD-10-CM

## 2021-11-16 DIAGNOSIS — M21.372 FOOT DROP, BILATERAL: ICD-10-CM

## 2021-11-16 DIAGNOSIS — N31.9 NEUROGENIC BLADDER: ICD-10-CM

## 2021-11-16 DIAGNOSIS — G89.29 CHRONIC NOCICEPTIVE PAIN: ICD-10-CM

## 2021-11-16 DIAGNOSIS — Z87.828 HISTORY OF SPINAL CORD INJURY: ICD-10-CM

## 2021-11-16 DIAGNOSIS — M62.838 MUSCLE SPASTICITY: ICD-10-CM

## 2021-11-16 DIAGNOSIS — F33.1 MODERATE EPISODE OF RECURRENT MAJOR DEPRESSIVE DISORDER (HCC): ICD-10-CM

## 2021-11-16 DIAGNOSIS — M21.371 FOOT DROP, BILATERAL: ICD-10-CM

## 2021-11-16 PROBLEM — K11.7 XEROSTOMIA: Status: RESOLVED | Noted: 2020-02-12 | Resolved: 2021-11-16

## 2021-11-16 PROBLEM — S83.511A RUPTURE OF ANTERIOR CRUCIATE LIGAMENT OF RIGHT KNEE: Status: RESOLVED | Noted: 2017-10-25 | Resolved: 2021-11-16
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
## 2021-11-16 PROCEDURE — 99215 OFFICE O/P EST HI 40 MIN: CPT | Performed by: PHYSICIAN ASSISTANT

## 2021-11-16 RX ORDER — OXYCODONE AND ACETAMINOPHEN 10; 325 MG/1; MG/1
TABLET ORAL
COMMUNITY
Start: 2021-10-13

## 2021-11-16 RX ORDER — NALOXONE HYDROCHLORIDE 4 MG/.1ML
SPRAY NASAL
COMMUNITY
Start: 2021-11-12

## 2021-11-16 RX ORDER — FLUOXETINE HYDROCHLORIDE 40 MG/1
40 CAPSULE ORAL DAILY
Qty: 90 CAPSULE | Refills: 1 | Status: SHIPPED | OUTPATIENT
Start: 2021-11-16 | End: 2022-01-17

## 2021-11-16 RX ORDER — PREGABALIN 300 MG/1
CAPSULE ORAL
COMMUNITY
Start: 2021-11-08

## 2021-11-16 RX ORDER — LISINOPRIL 10 MG/1
10 TABLET ORAL DAILY
Qty: 90 TABLET | Refills: 3 | Status: SHIPPED | OUTPATIENT
Start: 2021-11-16 | End: 2022-05-16 | Stop reason: SDUPTHER

## 2021-11-16 RX ORDER — BUPRENORPHINE 10 UG/H
PATCH TRANSDERMAL
COMMUNITY
Start: 2021-10-12 | End: 2021-11-16

## 2021-11-16 RX ORDER — BUPRENORPHINE HYDROCHLORIDE 300 UG/1
FILM, SOLUBLE BUCCAL
COMMUNITY
Start: 2021-11-09 | End: 2022-03-08

## 2021-11-16 ASSESSMENT — ANXIETY QUESTIONNAIRES
1. FEELING NERVOUS, ANXIOUS, OR ON EDGE: NOT AT ALL
3. WORRYING TOO MUCH ABOUT DIFFERENT THINGS: SEVERAL DAYS
IF YOU CHECKED OFF ANY PROBLEMS ON THIS QUESTIONNAIRE, HOW DIFFICULT HAVE THESE PROBLEMS MADE IT FOR YOU TO DO YOUR WORK, TAKE CARE OF THINGS AT HOME, OR GET ALONG WITH OTHER PEOPLE: NOT DIFFICULT AT ALL
6. BECOMING EASILY ANNOYED OR IRRITABLE: NOT AT ALL
5. BEING SO RESTLESS THAT IT IS HARD TO SIT STILL: NOT AT ALL
2. NOT BEING ABLE TO STOP OR CONTROL WORRYING: SEVERAL DAYS
7. FEELING AFRAID AS IF SOMETHING AWFUL MIGHT HAPPEN: NOT AT ALL
GAD7 TOTAL SCORE: 3
4. TROUBLE RELAXING: SEVERAL DAYS

## 2021-11-16 ASSESSMENT — PATIENT HEALTH QUESTIONNAIRE - PHQ9
3. TROUBLE FALLING OR STAYING ASLEEP OR SLEEPING TOO MUCH: NEARLY EVERY DAY
SUM OF ALL RESPONSES TO PHQ QUESTIONS 1-9: 8
4. FEELING TIRED OR HAVING LITTLE ENERGY: SEVERAL DAYS
1. LITTLE INTEREST OR PLEASURE IN DOING THINGS: NEARLY EVERY DAY
7. TROUBLE CONCENTRATING ON THINGS, SUCH AS READING THE NEWSPAPER OR WATCHING TELEVISION: NOT AT ALL
8. MOVING OR SPEAKING SO SLOWLY THAT OTHER PEOPLE COULD HAVE NOTICED. OR THE OPPOSITE, BEING SO FIGETY OR RESTLESS THAT YOU HAVE BEEN MOVING AROUND A LOT MORE THAN USUAL: NOT AT ALL
5. POOR APPETITE OR OVEREATING: NOT AT ALL
9. THOUGHTS THAT YOU WOULD BE BETTER OFF DEAD, OR OF HURTING YOURSELF: NOT AT ALL
2. FEELING DOWN, DEPRESSED, IRRITABLE, OR HOPELESS: SEVERAL DAYS
6. FEELING BAD ABOUT YOURSELF - OR THAT YOU ARE A FAILURE OR HAVE LET YOURSELF OR YOUR FAMILY DOWN: NOT AL ALL
SUM OF ALL RESPONSES TO PHQ9 QUESTIONS 1 AND 2: 4

## 2021-11-16 ASSESSMENT — PAIN SCALES - GENERAL: PAINLEVEL: 3=SLIGHT PAIN

## 2021-11-16 ASSESSMENT — FIBROSIS 4 INDEX: FIB4 SCORE: 0.93

## 2021-11-16 NOTE — PROGRESS NOTES
Subjective:     Chief Complaint   Patient presents with   • Establish Care - hx of spinal cord injury + neurogenic bladder/bowel       HPI:   Aroldo Joseph is a 45 y.o. male here to establish care and to discuss the evaluation and management of:     Hx of spinal cord injury with longstanding complications  Reports work comp injury in 2005 (age 29) where he fell 39 feet and landed with arms behind him. Spinal cord injury; broke pelvis, sacrum, both ribs. Right tibia. Had fusion of L1 compression fracture. Laced in TLSO. Developed pressure ulcers that required skin flap.   Neurogenic bladder- self catheterizes  Neurogenic bowel- digital stimulation. Has some rectal prolapse  Foot drop- right worse than left. No assistive devices.      Due to work comp being [place] based received primary care from Miriam Minor MD in [place]. Had recent colonoscopy which exacerbated his neurogenic bladder and wasn't able to make it [place]. MD's office refused to fill medication so he found local physician who prescribed him enough to get in with physiatry. Physiatry felt he was better suited for chronic pain management as he was not interested in any injections.     Prior (changed with pain mgmt) Drug Regimen:  -Spasticity: Takes Baclofen 30mg TID. Takes Valium 10mg PRN--alternates with Xanax. Reports Iván was concerned about tolerance so switched it intermittently   - Neuropathic Pain: On Lyrica 300mg BID + Gabapentin 300mg PRN (uses once in a while at night time)  - Nociceptive Pain: On Methadone 5mg BID for general pain + Percocet  QID.  (both percocet and methadone--initially was on 60 mg have been decreased over the years.  - Bladder: Self catheterizes. Hasnt seen Urology in years.   - Bowel: Has had loose stools since colonoscopy. Usually does DS BID. Has been taking probiotic.   - Percocet has been reduced over the years as well as the Methadone.     Pain management changes:  Saw pain specialist and felt  like he didn't explain treatment plan well.   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 Also excessively sweats. Reports that he didn't wean off methadone--some withdrawal symptoms.   Reports he had a follow-up with the PA in the office and she helped explain more. He is worried about changing too much at once since he has been stable and working for years.   Started on belbuca yesterday.    Pain management has concerns with him taking both Gabapentin and lyrica-- Gabapentin 3600 throughout day but wasn't doing enough. Then switched to Lyrica at 600 mg. So has gabapentin in addition to lyrica when it isn't enough.     Also concerned that he is taking Valium and Xanax. Reports he is currently taking Xanax. States previously with Methadone was taking maybe 2 a month but now needing more because he cannot sleep. When not on Xanax was prescribed Valium.     Essential HTN  Reports stable on lisinopril for years. No issues.     MDD  Reports has been taking fluoxetine 20 mg. Previously on 40 mg. States some times he feels like he is doing well so cuts down or off entirely. Thinks needs increase today.    Depression Screening  Little interest or pleasure in doing things?   Nearly every day  Feeling down, depressed , or hopeless?  Several days  Trouble falling or staying asleep, or sleeping too much?   Nearly every day  Feeling tired or having little energy?   Several days  Poor appetite or overeating?   Not at all  Feeling bad about yourself - or that you are a failure or have let yourself or your family down?  Not al all  Trouble concentrating on things, such as reading the newspaper or watching television?  Not at all  Moving or speaking so slowly that other people could have noticed.  Or the opposite - being so fidgety or restless that you have been moving around a lot more than usual?   Not at all  Thoughts that you would be  "better off dead, or of hurting yourself?   Not at all  Patient Health Questionnaire Score:  8    Current medicines (including changes today)  Current Outpatient Medications   Medication Sig Dispense Refill   • BELBUCA 300 MCG FILM      • NARCAN 4 MG/0.1ML Liquid      • oxyCODONE-acetaminophen (PERCOCET-10)  MG Tab      • pregabalin (LYRICA) 300 MG capsule      • FLUoxetine (PROZAC) 40 MG capsule Take 1 Capsule by mouth every day. 90 Capsule 1   • lisinopril (PRINIVIL) 10 MG Tab Take 1 Tablet by mouth every day. 90 Tablet 3   • ALPRAZolam (XANAX) 1 MG Tab Take 1 mg by mouth.     • baclofen (LIORESAL) 10 MG Tab      • diazepam (VALIUM) 10 MG tablet Take 10 mg by mouth every 8 hours as needed. ANXIETY     • gabapentin (NEURONTIN) 300 MG Cap Take 300 mg by mouth 3 times a day.       No current facility-administered medications for this visit.       Patient Active Problem List    Diagnosis Date Noted   • Essential hypertension 11/19/2021   • Family history of Raleigh's disease 12/06/2019   • Rectal prolapse 11/30/2019   • Moderate episode of recurrent major depressive disorder (HCC) 03/22/2018   • Cigarette nicotine dependence, uncomplicated 11/21/2017   • Conus medullaris syndrome ([facility]) 09/21/2012   • Foot drop, bilateral 09/21/2012   • Muscle spasticity 09/21/2012   • Neurogenic bladder 09/21/2012   • Neurogenic bowel 09/21/2012   • Osteopenia 09/21/2012   • Chronic nociceptive pain 09/21/2012   • Ulnar neuropathy 09/21/2012   • Unspecified injury at t11-T12 level of thoracic spinal cord, initial encounter ([facility]) 09/21/2012          Objective:     Vitals:    11/16/21 0743   BP: 126/58   BP Location: Left arm   Patient Position: Sitting   BP Cuff Size: Adult   Pulse: 78   Temp: 37.1 °C (98.7 °F)   TempSrc: Temporal   SpO2: 97%   Weight: 87.3 kg (192 lb 6.4 oz)   Height: 1.727 m (5' 8\")     Body mass index is 29.25 kg/m².     Physical Exam:  Constitutional: Alert, no distress, well-groomed. Ambulatory without " assistive device.  Skin: Warm, dry, good turgor, no rashes in visible areas.  Eye: Equal, round and reactive, conjunctiva clear, lids normal.  Neck: Trachea midline, no masses.  Respiratory: Unlabored respiratory effort, no cough.  Abdomen: Soft, no gross masses.  MSK: Antalgic gait, moves all extremities.  Neuro: Grossly non-focal. No cranial nerve deficit. Strength and sensation intact.   Psych: Alert and oriented x3, normal affect and mood.       Assessment and Plan:   The following treatment plan was discussed:     1. Chronic nociceptive pain  2. History of spinal cord injury  3. Foot drop, bilateral  4. Muscle spasticity  Chronic, stable at this time. Undergoing changes to medications to treat chronic pain. Encouraged him to work with his care team but to continue to ask questions. Encouraged him to try to be open minded to future procedures as medications only play a small role in treatment of chronic pain. Encouraged him to be open to therapeutic services in place through pain management as there is strong correlation between depression and chronic pain.   Advised that we do not prescribe benzodiazepines in addition to opiates. Typically the pain specialist will manage both.   I am open to continuing with baclofen, however, if pain specialist finds better intervention--should explore once other meds are stabilized.      5. Neurogenic bladder  Chronic, stable with self cath. Consider future follow-up with urology for monitoring/evaluation.    6. Neurogenic bowel  7. Rectal prolapse  Chronic, under care of GI in [place]. Continue with fiber supplementation. Considering repair of prolapse if any worsening.     8. Essential hypertension  Chronic, stable. Continue current medicines. Monitor labs regularly.   - lisinopril (PRINIVIL) 10 MG Tab; Take 1 Tablet by mouth every day.  Dispense: 90 Tablet; Refill: 3     9. Moderate episode of recurrent major depressive disorder (HCC)  Chronic, worsening. Will increase to  fluoxetine to 40 mg and follow-up in 4-6 weeks, sooner if needed.   - FLUoxetine (PROZAC) 40 MG capsule; Take 1 Capsule by mouth every day.  Dispense: 90 Capsule; Refill: 1    10. Family history of Micaela's disease  Chronic, mom undergoing treatment. He has not yet completed any genetic counseling/testing. Consider in future.       Followup: Return in about 1 month (around 12/16/2021) for follow-up on prozac, sooner if needed.         My total time spent caring for the patient on the day of the encounter was 52 minutes.   This does not include time spent on separately billable procedures/tests.

## 2021-11-19 PROBLEM — I10 ESSENTIAL HYPERTENSION: Status: ACTIVE | Noted: 2021-11-19

## 2021-11-19 PROBLEM — Z87.81 HISTORY OF TIBIAL FRACTURE: Status: RESOLVED | Noted: 2021-08-13 | Resolved: 2021-11-19

## 2021-11-19 PROBLEM — Z90.49 S/P CHOLECYSTECTOMY: Status: RESOLVED | Noted: 2021-08-13 | Resolved: 2021-11-19

## 2022-01-17 ENCOUNTER — OFFICE VISIT (OUTPATIENT)
Dept: MEDICAL GROUP | Age: 47
End: 2022-01-17
Payer: COMMERCIAL

## 2022-01-17 VITALS
HEART RATE: 70 BPM | BODY MASS INDEX: 28.79 KG/M2 | DIASTOLIC BLOOD PRESSURE: 64 MMHG | HEIGHT: 68 IN | WEIGHT: 190 LBS | TEMPERATURE: 97.6 F | SYSTOLIC BLOOD PRESSURE: 110 MMHG | OXYGEN SATURATION: 97 %

## 2022-01-17 DIAGNOSIS — D64.9 ANEMIA, UNSPECIFIED TYPE: ICD-10-CM

## 2022-01-17 DIAGNOSIS — R73.01 IFG (IMPAIRED FASTING GLUCOSE): ICD-10-CM

## 2022-01-17 DIAGNOSIS — G89.29 CHRONIC NOCICEPTIVE PAIN: ICD-10-CM

## 2022-01-17 DIAGNOSIS — N31.9 NEUROGENIC BLADDER: ICD-10-CM

## 2022-01-17 DIAGNOSIS — Z87.828 HISTORY OF SPINAL CORD INJURY: ICD-10-CM

## 2022-01-17 DIAGNOSIS — M62.838 MUSCLE SPASTICITY: ICD-10-CM

## 2022-01-17 DIAGNOSIS — F41.1 GAD (GENERALIZED ANXIETY DISORDER): ICD-10-CM

## 2022-01-17 DIAGNOSIS — I10 ESSENTIAL HYPERTENSION: ICD-10-CM

## 2022-01-17 DIAGNOSIS — F33.1 MODERATE EPISODE OF RECURRENT MAJOR DEPRESSIVE DISORDER (HCC): ICD-10-CM

## 2022-01-17 DIAGNOSIS — E55.9 VITAMIN D DEFICIENCY: ICD-10-CM

## 2022-01-17 DIAGNOSIS — E78.5 DYSLIPIDEMIA: ICD-10-CM

## 2022-01-17 DIAGNOSIS — K59.2 NEUROGENIC BOWEL: ICD-10-CM

## 2022-01-17 PROBLEM — F17.220 CHEWING TOBACCO NICOTINE DEPENDENCE WITHOUT COMPLICATION: Status: ACTIVE | Noted: 2017-11-21

## 2022-01-17 PROCEDURE — 99214 OFFICE O/P EST MOD 30 MIN: CPT | Performed by: PHYSICIAN ASSISTANT

## 2022-01-17 RX ORDER — ESCITALOPRAM OXALATE 10 MG/1
10 TABLET ORAL DAILY
Qty: 30 TABLET | Refills: 1 | Status: SHIPPED | OUTPATIENT
Start: 2022-01-17 | End: 2022-05-16 | Stop reason: SINTOL

## 2022-01-17 RX ORDER — BACLOFEN 20 MG/1
TABLET ORAL
COMMUNITY
Start: 2022-01-11

## 2022-01-17 RX ORDER — FLUOXETINE HYDROCHLORIDE 20 MG/1
20 CAPSULE ORAL DAILY
Qty: 30 CAPSULE | Refills: 1 | Status: SHIPPED | OUTPATIENT
Start: 2022-01-17 | End: 2022-05-16 | Stop reason: SDUPTHER

## 2022-01-17 ASSESSMENT — ANXIETY QUESTIONNAIRES
3. WORRYING TOO MUCH ABOUT DIFFERENT THINGS: SEVERAL DAYS
2. NOT BEING ABLE TO STOP OR CONTROL WORRYING: SEVERAL DAYS
5. BEING SO RESTLESS THAT IT IS HARD TO SIT STILL: NOT AT ALL
7. FEELING AFRAID AS IF SOMETHING AWFUL MIGHT HAPPEN: SEVERAL DAYS
1. FEELING NERVOUS, ANXIOUS, OR ON EDGE: SEVERAL DAYS
6. BECOMING EASILY ANNOYED OR IRRITABLE: NOT AT ALL
GAD7 TOTAL SCORE: 5
4. TROUBLE RELAXING: SEVERAL DAYS

## 2022-01-17 ASSESSMENT — PATIENT HEALTH QUESTIONNAIRE - PHQ9
CLINICAL INTERPRETATION OF PHQ2 SCORE: 2
5. POOR APPETITE OR OVEREATING: 1 - SEVERAL DAYS
SUM OF ALL RESPONSES TO PHQ QUESTIONS 1-9: 8

## 2022-01-17 ASSESSMENT — FIBROSIS 4 INDEX: FIB4 SCORE: 0.95

## 2022-01-17 NOTE — PROGRESS NOTES
Subjective:     Chief Complaint   Patient presents with   • Follow-Up     1 month FV    • Depression     and anxiety    • Ear Fullness     both ears plugged      Aroldo Joseph is a 46 y.o. male here today for evaluation and management of:    Chronic nociceptive pain/History of spinal cord injury/Foot drop, bilateral/Muscle spasticity  Patient reports he is having a difficulty time with pain specialist. Hoping to change to a different one that he aligns with more. Seems to be a personality conflict.   Reports there was concerns about his baclofen being too high so was decreased from 90 mg /day to 80 mg/day. He seems to be fine so far and no issues with adjustment.   States lyrica is unchanged.  Reports belbuca is impacting his sleep. Waking up hourly. Going to address with pain specialist at upcoming appointment.  Really desirous to return to methadone as it controlled his pain the best.  States he would even need less Percocet to manage his pain.  States he has a constant pain  That was previously gone with methadone.  However, he does indicate that they are planning to increase the Belbuca dose at his next visit.      Neurogenic bladder  No issues with self catheterization.  He has had a urology visit but it has been over a decade since his last visit.  He is okay with a consultation with urology.    Neurogenic bowel  Reports he was pleased with previous constipation that he had as it is easier to deal with with his neurogenic bowel.    Essential hypertension  Taking meds as prescribed. Tolerating well. No side effects.     Moderate episode of recurrent major depressive disorder (HCC)/ACE  Dose of Prozac was increased from 20 to 40 mg at last visit.  He reports if anything his symptoms have worsened since then.  States that every Tuesday he wakes up with sense of impending doom.  He cannot figure out why it is on Tuesday no changes in his patterns or energy levels it is more of a mental state.  He is able  "to pick himself back up by Wednesday.  He is interested in changing his Prozac to a different medication that targets more of his anxiety.    Current medicines (including changes today)  Current Outpatient Medications   Medication Sig Dispense Refill   • FLUoxetine (PROZAC) 20 MG Cap Take 1 Capsule by mouth every day. 30 Capsule 1   • escitalopram (LEXAPRO) 10 MG Tab Take 1 Tablet by mouth every day. 30 Tablet 1   • BELBUCA 300 MCG FILM      • NARCAN 4 MG/0.1ML Liquid      • oxyCODONE-acetaminophen (PERCOCET-10)  MG Tab      • pregabalin (LYRICA) 300 MG capsule      • lisinopril (PRINIVIL) 10 MG Tab Take 1 Tablet by mouth every day. 90 Tablet 3   • baclofen (LIORESAL) 20 MG tablet        No current facility-administered medications for this visit.        Objective:     Vitals:    01/17/22 0919   BP: 110/64   BP Location: Left arm   Patient Position: Sitting   BP Cuff Size: Adult   Pulse: 70   Temp: 36.4 °C (97.6 °F)   TempSrc: Temporal   SpO2: 97%   Weight: 86.2 kg (190 lb)   Height: 1.727 m (5' 8\")     Body mass index is 28.89 kg/m².     Physical Exam:  Constitutional: Alert, no distress, well-groomed.  Skin: Warm, dry, good turgor, no rashes in visible areas.  Eye: Equal, round and reactive, conjunctiva clear, lids normal.  Neck: Trachea midline, no masses, no thyromegaly.  Cardiovascular: Regular rate and rhythm.  Chest: Effort normal. Clear to auscultation throughout. No adventitious sounds.   Abdomen: Soft, no gross masses.  MSK: Normal gait, moves all extremities.  Neuro: Grossly non-focal.   Psych: Alert and oriented x3, normal affect and mood.    Results for orders placed or performed during the hospital encounter of 09/24/21   CBC WITH DIFFERENTIAL   Result Value Ref Range    WBC 4.6 (L) 4.8 - 10.8 K/uL    RBC 4.70 4.70 - 6.10 M/uL    Hemoglobin 13.6 (L) 14.0 - 18.0 g/dL    Hematocrit 41.0 (L) 42.0 - 52.0 %    MCV 87.2 81.4 - 97.8 fL    MCH 28.9 27.0 - 33.0 pg    MCHC 33.2 (L) 33.7 - 35.3 g/dL    RDW " 45.6 35.9 - 50.0 fL    Platelet Count 238 164 - 446 K/uL    MPV 11.6 9.0 - 12.9 fL    Neutrophils-Polys 59.60 44.00 - 72.00 %    Lymphocytes 30.30 22.00 - 41.00 %    Monocytes 8.10 0.00 - 13.40 %    Eosinophils 0.90 0.00 - 6.90 %    Basophils 0.90 0.00 - 1.80 %    Immature Granulocytes 0.20 0.00 - 0.90 %    Nucleated RBC 0.00 /100 WBC    Neutrophils (Absolute) 2.74 1.82 - 7.42 K/uL    Lymphs (Absolute) 1.39 1.00 - 4.80 K/uL    Monos (Absolute) 0.37 0.00 - 0.85 K/uL    Eos (Absolute) 0.04 0.00 - 0.51 K/uL    Baso (Absolute) 0.04 0.00 - 0.12 K/uL    Immature Granulocytes (abs) 0.01 0.00 - 0.11 K/uL    NRBC (Absolute) 0.00 K/uL   Comp Metabolic Panel   Result Value Ref Range    Sodium 137 135 - 145 mmol/L    Potassium 4.3 3.6 - 5.5 mmol/L    Chloride 107 96 - 112 mmol/L    Co2 21 20 - 33 mmol/L    Anion Gap 9.0 7.0 - 16.0    Glucose 107 (H) 65 - 99 mg/dL    Bun 24 (H) 8 - 22 mg/dL    Creatinine 0.94 0.50 - 1.40 mg/dL    Calcium 9.6 8.5 - 10.5 mg/dL    AST(SGOT) 22 12 - 45 U/L    ALT(SGPT) 19 2 - 50 U/L    Alkaline Phosphatase 86 30 - 99 U/L    Total Bilirubin 0.4 0.1 - 1.5 mg/dL    Albumin 4.6 3.2 - 4.9 g/dL    Total Protein 7.4 6.0 - 8.2 g/dL    Globulin 2.8 1.9 - 3.5 g/dL    A-G Ratio 1.6 g/dL   INSULIN FASTING   Result Value Ref Range    Insulin Fasting 6 3 - 25 uIU/mL   CRP HIGH SENSITIVE (CARDIAC)   Result Value Ref Range    C Reactive Protein High Sensitive 6.4 (H) 0.0 - 3.0 mg/L   HEMOGLOBIN A1C   Result Value Ref Range    Glycohemoglobin 5.5 4.0 - 5.6 %    Est Avg Glucose 111 mg/dL   VITAMIN D,25 HYDROXY   Result Value Ref Range    25-Hydroxy   Vitamin D 25 23 (L) 30 - 100 ng/mL   VITAMIN B12   Result Value Ref Range    Vitamin B12 -True Cobalamin 331 211 - 911 pg/mL   TSH WITH REFLEX TO FT4   Result Value Ref Range    TSH 1.160 0.380 - 5.330 uIU/mL   THYROID PEROXIDASE  (TPO) AB   Result Value Ref Range    Microsomal -Tpo- Abs <9.0 0.0 - 9.0 IU/mL   MAGNESIUM, RBC   Result Value Ref Range    Magnesium, RBC's  6.6 3.6 - 7.5 mg/dL   LipoFit by NMR   Result Value Ref Range    Cholesterol,Tot 189 <=199 mg/dL    Triglycerides 120 30 - 149 mg/dL    HDL 37 (L) 40 - 59 mg/dL    LDL Cholesterol 128 <=129 mg/dL    LDL Particle 1753 (H) <=1135 nmol/L    Small  (H) <=634 nmol/L    L-VLDL Particle No. 1.6 <=2.7 nmol/L    HDL Particle No. 30.5 (L) >=33.0 umol/L    L-HDL Particle No. <2.8 (L) >=4.2 umol/L    LDL Particle Size 20.8 >=20.7 nm    VLDL Size 43.0 <=46.7 nm    HDL Size 8.1 (L) >=8.9 nm    EER LipoFit by NMR See Note    ESTIMATED GFR   Result Value Ref Range    GFR If African American >60 >60 mL/min/1.73 m 2    GFR If Non African American >60 >60 mL/min/1.73 m 2    Reviewed and discussed above labs with patient in visit.   Assessment and Plan:   The following treatment plan was discussed:     1. Chronic nociceptive pain  2. History of spinal cord injury  3. Muscle spasticity  Chronic, stable. Continue current medicines as prescribed by pain specialist. Encouraged him to continue to work with them and consider nonmedication therapies such as injections/epidurals. He is working on changing to a different physician in the practice--he had one visit with her and felt they connected better. Monitor labs regularly. Follow-up with specialist as directed.   - baclofen (LIORESAL) 20 MG tablet    4. Neurogenic bladder  Chronic, stable. Continue self cath. Will send to urology for consult as it has been quite some time since last evaluated.   - Referral to Urology    5. Neurogenic bowel  Stable. Continue to monitor rectal prolapse.     6. Essential hypertension  Chronic, stable. Continue current medicines. Monitor labs regularly.  - Comp Metabolic Panel; Future    7. Moderate episode of recurrent major depressive disorder (HCC)  8. ACE (generalized anxiety disorder)  Chronic, exacerbated. Will wean off prozac ( 20 mg daily x 1 week then every other day x 1 week then of) and start Lexapro at 1/2 tab (5 mg) one he is on every other  day of prozac. After one week he will increase to 10 mg daily. We will follow-up in 4-6 weeks.   - FLUoxetine (PROZAC) 20 MG Cap; Take 1 Capsule by mouth every day.  Dispense: 30 Capsule; Refill: 1  - escitalopram (LEXAPRO) 10 MG Tab; Take 1 Tablet by mouth every day.  Dispense: 30 Tablet; Refill: 1    9. IFG (impaired fasting glucose)  Chronic, stable. Continue lifestyle modifications. Monitor labs regularly.   - HEMOGLOBIN A1C; Future    10. Dyslipidemia  Chronic, stable. Continue lifestyle modifications. Monitor labs regularly.   Counseled on diet and exercise.    - Comp Metabolic Panel; Future  - Lipid Profile; Future  - TSH WITH REFLEX TO FT4; Future    11. Vitamin D deficiency  Chronic. Start 1,000 IU of D3 daily.     12. Anemia, unspecified type  New problem, will work it up further, likely due to chronic disease.  OK to start multivitamin daily.  - CBC WITH DIFFERENTIAL; Future  - IRON/TOTAL IRON BIND; Future      Followup: Return in about 1 month (around 2/17/2022) for follow-up on lexapro, sooner if needed.

## 2022-01-17 NOTE — LETTER
[facility]  Deepali Mitchell P.A.-C.  [address]  [place] [address]  Fax: [phone]   Authorization for Release/Disclosure of   Protected Health Information   Name: ALBINO LITTLE : 1975 SSN: [id number]   Address: [facility] [address] [place] [address] Phone:    [phone] (home) [phone] (work)   I authorize the entity listed below to release/disclose the PHI below to:   [facility]/Deepali Mitchell P.A.-C. and Deepali Mitchell P.A.-C.   Provider or Entity Name:  Kevin Gaona M.D.  Physical Medicine & Rehabilitation Pain Medicine 2021 End  21   Phone: [phone]; Fax: [phone]           Address   City, [facility]   Phone:      Fax: [phone]     Reason for request: continuity of care   Information to be released:    [  ] LAST COLONOSCOPY,  including any PATH REPORT and follow-up  [  ] LAST FIT/COLOGUARD RESULT [  ] LAST DEXA  [  ] LAST MAMMOGRAM  [  ] LAST PAP  [  ] LAST LABS [  ] RETINA EXAM REPORT  [  ] IMMUNIZATION RECORDS  [XX] Release all info Last Visit      [  ] Check here and initial the line next to each item to release ALL health information INCLUDING  _____ Care and treatment for drug and / or alcohol abuse  _____ HIV testing, infection status, or AIDS  _____ Genetic Testing    DATES OF SERVICE OR TIME PERIOD TO BE DISCLOSED: _____________  I understand and acknowledge that:  * This Authorization may be revoked at any time by you in writing, except if your health information has already been used or disclosed.  * Your health information that will be used or disclosed as a result of you signing this authorization could be re-disclosed by the recipient. If this occurs, your re-disclosed health information may no longer be protected by State or Federal laws.  * You may refuse to sign this Authorization. Your refusal will not affect your ability to obtain treatment.  * This Authorization becomes effective upon signing and will  on (date) __________.      If no  date is indicated, this Authorization will  one (1) year from the signature date.    Name: Aroldo Ray Opal    Signature:  Continuity of care  Date:     2022       PLEASE FAX REQUESTED RECORDS BACK TO: [phone]

## 2022-01-24 NOTE — PROGRESS NOTES
Order(s) created erroneously. Erroneous order ID: [id number]   Order canceled by: DENNIS VILLATORO   Order cancel date/time: 01/24/2022 12:01 PM   Erroneous order ID: [id number]   Order canceled by: DENNIS VILLATORO   Order cancel date/time: 01/24/2022 12:01 PM

## 2022-03-08 ENCOUNTER — OFFICE VISIT (OUTPATIENT)
Dept: MEDICAL GROUP | Age: 47
End: 2022-03-08
Payer: COMMERCIAL

## 2022-03-08 VITALS
SYSTOLIC BLOOD PRESSURE: 96 MMHG | TEMPERATURE: 97.1 F | WEIGHT: 193.2 LBS | DIASTOLIC BLOOD PRESSURE: 60 MMHG | HEART RATE: 73 BPM | HEIGHT: 68 IN | BODY MASS INDEX: 29.28 KG/M2 | OXYGEN SATURATION: 95 %

## 2022-03-08 DIAGNOSIS — F41.1 GAD (GENERALIZED ANXIETY DISORDER): ICD-10-CM

## 2022-03-08 DIAGNOSIS — G89.29 CHRONIC NOCICEPTIVE PAIN: ICD-10-CM

## 2022-03-08 DIAGNOSIS — I10 ESSENTIAL HYPERTENSION: ICD-10-CM

## 2022-03-08 DIAGNOSIS — F33.1 MODERATE EPISODE OF RECURRENT MAJOR DEPRESSIVE DISORDER (HCC): ICD-10-CM

## 2022-03-08 DIAGNOSIS — E78.5 DYSLIPIDEMIA: ICD-10-CM

## 2022-03-08 DIAGNOSIS — R73.01 IFG (IMPAIRED FASTING GLUCOSE): ICD-10-CM

## 2022-03-08 PROCEDURE — 99214 OFFICE O/P EST MOD 30 MIN: CPT | Performed by: PHYSICIAN ASSISTANT

## 2022-03-08 RX ORDER — BUPRENORPHINE 10 UG/H
PATCH TRANSDERMAL
COMMUNITY
Start: 2022-01-27 | End: 2022-03-08

## 2022-03-08 RX ORDER — BUPRENORPHINE HYDROCHLORIDE 600 UG/1
FILM, SOLUBLE BUCCAL
COMMUNITY
Start: 2022-02-23

## 2022-03-08 ASSESSMENT — ANXIETY QUESTIONNAIRES
1. FEELING NERVOUS, ANXIOUS, OR ON EDGE: MORE THAN HALF THE DAYS
2. NOT BEING ABLE TO STOP OR CONTROL WORRYING: MORE THAN HALF THE DAYS
3. WORRYING TOO MUCH ABOUT DIFFERENT THINGS: NOT AT ALL
7. FEELING AFRAID AS IF SOMETHING AWFUL MIGHT HAPPEN: NOT AT ALL
5. BEING SO RESTLESS THAT IT IS HARD TO SIT STILL: NOT AT ALL
6. BECOMING EASILY ANNOYED OR IRRITABLE: NOT AT ALL
GAD7 TOTAL SCORE: 5
4. TROUBLE RELAXING: SEVERAL DAYS

## 2022-03-08 ASSESSMENT — PATIENT HEALTH QUESTIONNAIRE - PHQ9
5. POOR APPETITE OR OVEREATING: 0 - NOT AT ALL
CLINICAL INTERPRETATION OF PHQ2 SCORE: 2
SUM OF ALL RESPONSES TO PHQ QUESTIONS 1-9: 8

## 2022-03-08 ASSESSMENT — FIBROSIS 4 INDEX: FIB4 SCORE: 0.95

## 2022-03-08 NOTE — PROGRESS NOTES
Subjective:     Chief Complaint   Patient presents with   • Follow-Up     6 week medication follow up LEXAPRO   • Back Pain     X 6 weeks     Aroldo Joseph is a 46 y.o. male here today for evaluation and management of:    Moderate episode of recurrent major depressive disorder (HCC)/ ACE (generalized anxiety disorder)  Didn't start lexapro but weaned down to 20 mg of fluoxetine.   Feeling better. Not as anxious.  ACE 7 11/16/2021 1/17/2022 3/8/2022   ACE-7 Total Score 3 5 5     Depression Screen (PHQ-2/PHQ-9) 11/16/2021 1/17/2022 3/8/2022   PHQ-2 Total Score 4 2 2   PHQ-9 Total Score 8 8 8     Essential hypertension  Stable. Currently taking meds as directed.   He is not monitoring BP at home.   Denies symptoms low BP: light-headed, tunnel-vision, unusual fatigue.   Denies symptoms high BP:pounding headache, visual changes, palpitations, flushed face.   Denies medicine side effects: unusual fatigue, slow heartbeat, foot/leg swelling, cough.     Chronic nociceptive pain  Taking belbuca 600 mcg (increased from 300 mcg). Itchy at night so cutting back to 300 mcg at night and 600 mcg in the AM.   Reports some muscle spasm and was prescribed flexeril in past wondering if he could have that for this.   Baclofen prescription hasn't been correct. Unable to take mid day dose so has been splitting in twice daily dose. Since then pharmacy has been filling incorrectly. Thinks that since this happened, might be source of flare-up.    Current medicines (including changes today)  Current Outpatient Medications   Medication Sig Dispense Refill   • BELBUCA 600 MCG FILM DISSOLVE 1 FILM IN CHEEK TWICE DAILY AS NEEDED. DNFU 2-25-22     • FLUoxetine (PROZAC) 20 MG Cap Take 1 Capsule by mouth every day. 30 Capsule 1   • baclofen (LIORESAL) 20 MG tablet      • NARCAN 4 MG/0.1ML Liquid      • oxyCODONE-acetaminophen (PERCOCET-10)  MG Tab      • pregabalin (LYRICA) 300 MG capsule      • lisinopril (PRINIVIL) 10 MG Tab Take 1  "Tablet by mouth every day. 90 Tablet 3   • escitalopram (LEXAPRO) 10 MG Tab Take 1 Tablet by mouth every day. 30 Tablet 1     No current facility-administered medications for this visit.        Objective:     Vitals:    03/08/22 0737   BP: (!) 96/60   BP Location: Left arm   Patient Position: Sitting   BP Cuff Size: Adult   Pulse: 73   Temp: 36.2 °C (97.1 °F)   SpO2: 95%   Weight: 87.6 kg (193 lb 3.2 oz)   Height: 1.727 m (5' 8\")     Body mass index is 29.38 kg/m².     Physical Exam:  Constitutional: Alert, no distress, well-groomed.  Skin: Warm, dry, good turgor, no rashes in visible areas.  Eye: Equal, round and reactive, conjunctiva clear, lids normal.  Neck: Trachea midline, no masses, no thyromegaly.  Cardiovascular: Regular rate and rhythm.  Abdomen: Soft.  MSK: Normal gait, moves all extremities.  Neuro: Grossly non-focal.   Psych: Alert and oriented x3, normal affect and mood.    Assessment and Plan:   The following treatment plan was discussed:     1. Moderate episode of recurrent major depressive disorder (HCC)  2. ACE (generalized anxiety disorder)  Chronic, exacerbated though improved since last visit. Would still like to taper off fluoxetine and switch to lexapro. Taper instructions provided in patient instructions on AVS and printed/handed to patient.   If worsening or no improvement in symptoms, may return to 20 mg of fluoxetine.    3. Essential hypertension  Chronic, stable. Continue current medicines. Monitor labs regularly--due before next visit.     4. IFG (impaired fasting glucose)  5. Dyslipidemia  Chronic, stable. Continue lifestyle modifications. Monitor labs regularly--due before next visit.     6. Chronic nociceptive pain  Chronic, exacerbated due to changes in muscle relaxer. Explained baclofen and flexeril similar and should work with pain management and pharmacy team in fixing prescription. Patient verbalizes understanding.  - BELBUCA 600 MCG FILM; DISSOLVE 1 FILM IN CHEEK TWICE DAILY AS " NEEDED. DNFU 2-25-22      Health Maintenance: Declines Tdap this visit, will do next visit. Wants to mentally prepare.    Followup: Return in about 2 months (around 5/8/2022) for lab review, follow-up on lexapro.

## 2022-03-08 NOTE — PATIENT INSTRUCTIONS
1. Take fluoxetine 20 mg every other day x 1 week and start Lexapro at 1/2 tab (5 mg) daily.   2. After one week stop fluoxetine and increase lexapro to 10 mg daily.

## 2022-05-16 ENCOUNTER — OFFICE VISIT (OUTPATIENT)
Dept: MEDICAL GROUP | Age: 47
End: 2022-05-16
Payer: COMMERCIAL

## 2022-05-16 VITALS
TEMPERATURE: 97.1 F | BODY MASS INDEX: 28.95 KG/M2 | WEIGHT: 191 LBS | HEART RATE: 64 BPM | SYSTOLIC BLOOD PRESSURE: 108 MMHG | OXYGEN SATURATION: 95 % | HEIGHT: 68 IN | DIASTOLIC BLOOD PRESSURE: 58 MMHG

## 2022-05-16 DIAGNOSIS — R73.01 IFG (IMPAIRED FASTING GLUCOSE): ICD-10-CM

## 2022-05-16 DIAGNOSIS — F33.1 MODERATE EPISODE OF RECURRENT MAJOR DEPRESSIVE DISORDER (HCC): ICD-10-CM

## 2022-05-16 DIAGNOSIS — N31.9 NEUROGENIC BLADDER: ICD-10-CM

## 2022-05-16 DIAGNOSIS — I10 ESSENTIAL HYPERTENSION: ICD-10-CM

## 2022-05-16 DIAGNOSIS — F41.1 GAD (GENERALIZED ANXIETY DISORDER): ICD-10-CM

## 2022-05-16 DIAGNOSIS — E78.5 DYSLIPIDEMIA: ICD-10-CM

## 2022-05-16 PROCEDURE — 99214 OFFICE O/P EST MOD 30 MIN: CPT | Performed by: PHYSICIAN ASSISTANT

## 2022-05-16 RX ORDER — FLUOXETINE HYDROCHLORIDE 20 MG/1
20 CAPSULE ORAL DAILY
Qty: 90 CAPSULE | Refills: 3 | Status: SHIPPED | OUTPATIENT
Start: 2022-05-16 | End: 2022-11-18 | Stop reason: SDUPTHER

## 2022-05-16 RX ORDER — CIPROFLOXACIN 500 MG/1
500 TABLET, FILM COATED ORAL 2 TIMES DAILY
Qty: 14 TABLET | Refills: 3 | Status: SHIPPED | OUTPATIENT
Start: 2022-05-16 | End: 2022-05-23

## 2022-05-16 RX ORDER — LISINOPRIL 10 MG/1
10 TABLET ORAL DAILY
Qty: 90 TABLET | Refills: 3 | Status: SHIPPED | OUTPATIENT
Start: 2022-05-16 | End: 2022-11-18 | Stop reason: SDUPTHER

## 2022-05-16 ASSESSMENT — FIBROSIS 4 INDEX: FIB4 SCORE: 0.95

## 2022-05-16 NOTE — PROGRESS NOTES
"  Subjective:     Chief Complaint   Patient presents with   • Medication Follow-up   • Medication Refill     cipro     Aroldo Joseph is a 46 y.o. male here today for evaluation and management of:    Moderate episode of recurrent major depressive disorder (HCC)/ACE (generalized anxiety disorder)  Was on the lexapro for about a month-- \"didn't like it.\" Prefers prozac and went back on 20 mg. Stable, but needs refills.    Essential hypertension  Stable. Currently taking lisinopril 10 mg as directed.   Denies symptoms low BP: light-headed, tunnel-vision, unusual fatigue.   Denies symptoms high BP:pounding headache, visual changes, palpitations, flushed face.   Denies medicine side effects: unusual fatigue, slow heartbeat, foot/leg swelling, cough.     Dyslipidemia/ IFG (impaired fasting glucose)  Didn't complete labs.   Asymptomatic    Chronic pain  Doing well with belbuca and oxycodone.  Baclofen prescription split between 20s and 10s and costing too much.   Considering changing practices to a different group--costs are too high.     Neurogenic bladder  About 3 utis per year. Just finished last prescription of cipro. Requesting a refill. Also requesting test strips.  Knows he has a UTI when urine changes color and is malodorous. Also notices ring in toilet. No pain.   Self caths. No flank pain or fever.  Does well with cipro BID x 7 days. In past was on cephalosporin.      Current medicines (including changes today)  Current Outpatient Medications   Medication Sig Dispense Refill   • FLUoxetine (PROZAC) 20 MG Cap Take 1 Capsule by mouth every day. 90 Capsule 3   • lisinopril (PRINIVIL) 10 MG Tab Take 1 Tablet by mouth every day. 90 Tablet 3   • ciprofloxacin (CIPRO) 500 MG Tab Take 1 Tablet by mouth in the morning and 1 Tablet in the evening. Do all this for 7 days. 14 Tablet 3   • NON SPECIFIED Use  One Urinalysis test strips daily as needed for malodorous urine or urinary retention 10 Each 3   • BELBUCA 600 " "MCG FILM DISSOLVE 1 FILM IN CHEEK TWICE DAILY AS NEEDED. DNFU 2-25-22     • baclofen (LIORESAL) 20 MG tablet      • NARCAN 4 MG/0.1ML Liquid      • oxyCODONE-acetaminophen (PERCOCET-10)  MG Tab      • pregabalin (LYRICA) 300 MG capsule        No current facility-administered medications for this visit.        Objective:     Vitals:    05/16/22 0706   BP: 108/58   BP Location: Left arm   Patient Position: Sitting   BP Cuff Size: Adult   Pulse: 64   Temp: 36.2 °C (97.1 °F)   SpO2: 95%   Weight: 86.6 kg (191 lb)   Height: 1.727 m (5' 8\")     Body mass index is 29.04 kg/m².     Physical Exam:  Constitutional: Alert, no distress, well-groomed.  Skin: Warm, dry, good turgor, no rashes in visible areas.  Eye: Equal, round and reactive, conjunctiva clear, lids normal.  Neck: Trachea midline, no masses, no thyromegaly.  Cardiovascular: Regular rate and rhythm.  Chest: Effort normal. Clear to auscultation throughout. No adventitious sounds.   Abdomen: Soft, no gross masses.  MSK: Normal gait, moves all extremities.  Neuro: Grossly non-focal. No cranial nerve deficit. Strength and sensation intact.   Psych: Alert and oriented x3, normal affect and mood.    Assessment and Plan:   The following treatment plan was discussed:     1. Moderate episode of recurrent major depressive disorder (HCC)  2. ACE (generalized anxiety disorder)  Chronic, stable. Continue current medicines. Monitor labs regularly.   - FLUoxetine (PROZAC) 20 MG Cap; Take 1 Capsule by mouth every day.  Dispense: 90 Capsule; Refill: 3    3. Essential hypertension  Chronic, stable. Continue current medicines. Monitor labs regularly.   - lisinopril (PRINIVIL) 10 MG Tab; Take 1 Tablet by mouth every day.  Dispense: 90 Tablet; Refill: 3    4. Dyslipidemia  5. IFG (impaired fasting glucose)  Chronic, stable. Continue lifestyle modifications. Monitor labs regularly.   Reminded to complete labs.    6. Neurogenic bladder  Chronic, stable. Recently exacerbated with " UTI. Cipro refilled. Will try to get him test strips as he reports they have been successfully covered/prescribed in past. Offered standing UA with lab--declines at this time.  - ciprofloxacin (CIPRO) 500 MG Tab; Take 1 Tablet by mouth in the morning and 1 Tablet in the evening. Do all this for 7 days.  Dispense: 14 Tablet; Refill: 3  - NON SPECIFIED; Use  One Urinalysis test strips daily as needed for malodorous urine or urinary retention  Dispense: 10 Each; Refill: 3    Health Maintenance: declines TD    Followup: Return in about 6 months (around 11/16/2022) for appointment to establish care with new PCP, sooner if needed.

## 2022-11-15 SDOH — ECONOMIC STABILITY: HOUSING INSECURITY
IN THE LAST 12 MONTHS, WAS THERE A TIME WHEN YOU DID NOT HAVE A STEADY PLACE TO SLEEP OR SLEPT IN A SHELTER (INCLUDING NOW)?: YES

## 2022-11-15 SDOH — ECONOMIC STABILITY: HOUSING INSECURITY: IN THE LAST 12 MONTHS, HOW MANY PLACES HAVE YOU LIVED?: 1

## 2022-11-15 SDOH — ECONOMIC STABILITY: FOOD INSECURITY: WITHIN THE PAST 12 MONTHS, YOU WORRIED THAT YOUR FOOD WOULD RUN OUT BEFORE YOU GOT MONEY TO BUY MORE.: SOMETIMES TRUE

## 2022-11-15 SDOH — ECONOMIC STABILITY: HOUSING INSECURITY
IN THE LAST 12 MONTHS, WAS THERE A TIME WHEN YOU DID NOT HAVE A STEADY PLACE TO SLEEP OR SLEPT IN A SHELTER (INCLUDING NOW)?: NO

## 2022-11-15 SDOH — HEALTH STABILITY: PHYSICAL HEALTH: ON AVERAGE, HOW MANY DAYS PER WEEK DO YOU ENGAGE IN MODERATE TO STRENUOUS EXERCISE (LIKE A BRISK WALK)?: 0 DAYS

## 2022-11-15 SDOH — ECONOMIC STABILITY: FOOD INSECURITY: WITHIN THE PAST 12 MONTHS, THE FOOD YOU BOUGHT JUST DIDN'T LAST AND YOU DIDN'T HAVE MONEY TO GET MORE.: SOMETIMES TRUE

## 2022-11-15 SDOH — ECONOMIC STABILITY: INCOME INSECURITY: IN THE LAST 12 MONTHS, WAS THERE A TIME WHEN YOU WERE NOT ABLE TO PAY THE MORTGAGE OR RENT ON TIME?: YES

## 2022-11-15 SDOH — ECONOMIC STABILITY: INCOME INSECURITY: HOW HARD IS IT FOR YOU TO PAY FOR THE VERY BASICS LIKE FOOD, HOUSING, MEDICAL CARE, AND HEATING?: SOMEWHAT HARD

## 2022-11-15 SDOH — HEALTH STABILITY: PHYSICAL HEALTH: ON AVERAGE, HOW MANY MINUTES DO YOU ENGAGE IN EXERCISE AT THIS LEVEL?: 0 MIN

## 2022-11-15 SDOH — ECONOMIC STABILITY: TRANSPORTATION INSECURITY
IN THE PAST 12 MONTHS, HAS THE LACK OF TRANSPORTATION KEPT YOU FROM MEDICAL APPOINTMENTS OR FROM GETTING MEDICATIONS?: NO

## 2022-11-15 SDOH — HEALTH STABILITY: MENTAL HEALTH
STRESS IS WHEN SOMEONE FEELS TENSE, NERVOUS, ANXIOUS, OR CAN'T SLEEP AT NIGHT BECAUSE THEIR MIND IS TROUBLED. HOW STRESSED ARE YOU?: VERY MUCH

## 2022-11-15 SDOH — ECONOMIC STABILITY: TRANSPORTATION INSECURITY
IN THE PAST 12 MONTHS, HAS LACK OF RELIABLE TRANSPORTATION KEPT YOU FROM MEDICAL APPOINTMENTS, MEETINGS, WORK OR FROM GETTING THINGS NEEDED FOR DAILY LIVING?: NO

## 2022-11-15 SDOH — ECONOMIC STABILITY: TRANSPORTATION INSECURITY
IN THE PAST 12 MONTHS, HAS LACK OF TRANSPORTATION KEPT YOU FROM MEETINGS, WORK, OR FROM GETTING THINGS NEEDED FOR DAILY LIVING?: NO

## 2022-11-15 ASSESSMENT — SOCIAL DETERMINANTS OF HEALTH (SDOH)
HOW OFTEN DO YOU ATTEND CHURCH OR RELIGIOUS SERVICES?: NEVER
IN A TYPICAL WEEK, HOW MANY TIMES DO YOU TALK ON THE PHONE WITH FAMILY, FRIENDS, OR NEIGHBORS?: MORE THAN THREE TIMES A WEEK
HOW OFTEN DO YOU HAVE SIX OR MORE DRINKS ON ONE OCCASION: NEVER
HOW OFTEN DO YOU ATTENT MEETINGS OF THE CLUB OR ORGANIZATION YOU BELONG TO?: 1 TO 4 TIMES PER YEAR
DO YOU BELONG TO ANY CLUBS OR ORGANIZATIONS SUCH AS CHURCH GROUPS UNIONS, FRATERNAL OR ATHLETIC GROUPS, OR SCHOOL GROUPS?: YES
HOW MANY DRINKS CONTAINING ALCOHOL DO YOU HAVE ON A TYPICAL DAY WHEN YOU ARE DRINKING: PATIENT DOES NOT DRINK
WITHIN THE PAST 12 MONTHS, YOU WORRIED THAT YOUR FOOD WOULD RUN OUT BEFORE YOU GOT THE MONEY TO BUY MORE: SOMETIMES TRUE
HOW OFTEN DO YOU GET TOGETHER WITH FRIENDS OR RELATIVES?: PATIENT DECLINED
HOW OFTEN DO YOU HAVE A DRINK CONTAINING ALCOHOL: NEVER
HOW OFTEN DO YOU GET TOGETHER WITH FRIENDS OR RELATIVES?: PATIENT DECLINED
HOW OFTEN DO YOU ATTEND CHURCH OR RELIGIOUS SERVICES?: NEVER
HOW OFTEN DO YOU ATTENT MEETINGS OF THE CLUB OR ORGANIZATION YOU BELONG TO?: 1 TO 4 TIMES PER YEAR
HOW HARD IS IT FOR YOU TO PAY FOR THE VERY BASICS LIKE FOOD, HOUSING, MEDICAL CARE, AND HEATING?: SOMEWHAT HARD
IN A TYPICAL WEEK, HOW MANY TIMES DO YOU TALK ON THE PHONE WITH FAMILY, FRIENDS, OR NEIGHBORS?: MORE THAN THREE TIMES A WEEK
DO YOU BELONG TO ANY CLUBS OR ORGANIZATIONS SUCH AS CHURCH GROUPS UNIONS, FRATERNAL OR ATHLETIC GROUPS, OR SCHOOL GROUPS?: YES

## 2022-11-15 ASSESSMENT — LIFESTYLE VARIABLES
AUDIT-C TOTAL SCORE: 0
SKIP TO QUESTIONS 9-10: 1
HOW OFTEN DO YOU HAVE A DRINK CONTAINING ALCOHOL: NEVER
HOW OFTEN DO YOU HAVE SIX OR MORE DRINKS ON ONE OCCASION: NEVER
HOW MANY STANDARD DRINKS CONTAINING ALCOHOL DO YOU HAVE ON A TYPICAL DAY: PATIENT DOES NOT DRINK

## 2022-11-18 ENCOUNTER — OFFICE VISIT (OUTPATIENT)
Dept: MEDICAL GROUP | Age: 47
End: 2022-11-18
Payer: COMMERCIAL

## 2022-11-18 VITALS
WEIGHT: 183 LBS | HEIGHT: 68 IN | SYSTOLIC BLOOD PRESSURE: 110 MMHG | TEMPERATURE: 96.7 F | OXYGEN SATURATION: 96 % | DIASTOLIC BLOOD PRESSURE: 64 MMHG | HEART RATE: 80 BPM | BODY MASS INDEX: 27.74 KG/M2

## 2022-11-18 DIAGNOSIS — I10 ESSENTIAL HYPERTENSION: ICD-10-CM

## 2022-11-18 DIAGNOSIS — Z83.3 FHX: TYPE 2 DIABETES MELLITUS: ICD-10-CM

## 2022-11-18 DIAGNOSIS — E55.9 VITAMIN D INSUFFICIENCY: ICD-10-CM

## 2022-11-18 DIAGNOSIS — R45.1 RESTLESSNESS: ICD-10-CM

## 2022-11-18 DIAGNOSIS — F33.1 MODERATE EPISODE OF RECURRENT MAJOR DEPRESSIVE DISORDER (HCC): ICD-10-CM

## 2022-11-18 DIAGNOSIS — Z11.59 NEED FOR HEPATITIS C SCREENING TEST: ICD-10-CM

## 2022-11-18 DIAGNOSIS — R73.01 IMPAIRED FASTING BLOOD SUGAR: ICD-10-CM

## 2022-11-18 DIAGNOSIS — Z23 NEED FOR VACCINATION: ICD-10-CM

## 2022-11-18 DIAGNOSIS — E78.00 PURE HYPERCHOLESTEROLEMIA: ICD-10-CM

## 2022-11-18 PROCEDURE — 99214 OFFICE O/P EST MOD 30 MIN: CPT | Performed by: FAMILY MEDICINE

## 2022-11-18 RX ORDER — FLUOXETINE HYDROCHLORIDE 40 MG/1
40 CAPSULE ORAL DAILY
Qty: 90 CAPSULE | Refills: 1 | Status: SHIPPED | OUTPATIENT
Start: 2022-11-18 | End: 2023-06-16

## 2022-11-18 RX ORDER — ROPINIROLE 0.25 MG/1
0.25 TABLET, FILM COATED ORAL
Qty: 90 TABLET | Refills: 0 | Status: SHIPPED | OUTPATIENT
Start: 2022-11-18 | End: 2023-06-16

## 2022-11-18 RX ORDER — LISINOPRIL 10 MG/1
10 TABLET ORAL DAILY
Qty: 90 TABLET | Refills: 3 | Status: SHIPPED | OUTPATIENT
Start: 2022-11-18

## 2022-11-18 ASSESSMENT — FIBROSIS 4 INDEX: FIB4 SCORE: 0.95

## 2022-11-18 NOTE — PROGRESS NOTES
Subjective:   CC: Depression, hypertension    HPI:     Aroldo Joseph is a 46 y.o. male, established patient of Deepali Armas.  Patient has chronic depression and hypertension.  Both conditions are controlled with fluoxetine 40 mg and lisinopril 10 mg daily.  Patient tolerated both medication well, no side effects reported.    He also complains of recent development of upper extremity restlessness at night leading to difficulty with sleep initiation. He takes Lyrica 300 mg twice daily for chronic pain syndrome which helps with pain but does not control restlessness.  Negative history of drugs, alcohol abuse.  No recent changes in medication.      Current medicines (including changes today)  Current Outpatient Medications   Medication Sig Dispense Refill    FLUoxetine (PROZAC) 40 MG capsule Take 1 Capsule by mouth every day. 90 Capsule 1    lisinopril (PRINIVIL) 10 MG Tab Take 1 Tablet by mouth every day. 90 Tablet 3    ROPINIRole (REQUIP) 0.25 MG Tab Take 1 Tablet by mouth at bedtime. 90 Tablet 0    NON SPECIFIED Use  One Urinalysis test strips daily as needed for malodorous urine or urinary retention 10 Each 3    BELBUCA 600 MCG FILM DISSOLVE 1 FILM IN CHEEK TWICE DAILY AS NEEDED. DNFU 2-25-22      baclofen (LIORESAL) 20 MG tablet       NARCAN 4 MG/0.1ML Liquid       oxyCODONE-acetaminophen (PERCOCET-10)  MG Tab       pregabalin (LYRICA) 300 MG capsule        No current facility-administered medications for this visit.     He  has a past medical history of History of tibial fracture (8/13/2021), S/P cholecystectomy (8/13/2021), Spinal cord injury, T7-T12 ([facility]), and Tibia fracture (9/21/2012).    He has no past medical history of Allergy, Asthma, or Cancer ([facility]).    I reviewed patient's problem list, allergies, medications, family hx, social hx with patient and update EPIC.        Objective:     /64 (BP Location: Right arm, Patient Position: Sitting, BP Cuff Size: Small adult)   Pulse 80   Temp  "35.9 °C (96.7 °F) (Temporal)   Ht 1.727 m (5' 8\")   Wt 83 kg (183 lb)   SpO2 96%  Body mass index is 27.83 kg/m².    Physical Exam:  Constitutional: awake, alert, in no distress.  Skin: Warm, dry, good turgor, no rashes, bruises, ulcers in visible areas.  Eye: conjunctiva clear, lids neg for edema or lesions.  Neck: Trachea midline, no masses, no thyromegaly. No cervical or supraclavicular lymphadenopathy  Respiratory: Unlabored respiratory effort, lungs clear to auscultation, no wheezes, no rales.  Cardiovascular: Normal S1, S2, no murmur, no pedal edema.  Psych: Oriented x3, affect and mood wnl, intact judgement and insight.       Assessment and Plan:   The following treatment plan was discussed    1. Moderate episode of recurrent major depressive disorder (HCC)  Chronic, controlled with Prozac 40 mg daily, no s/e reported, will continue.    - FLUoxetine (PROZAC) 40 MG capsule; Take 1 Capsule by mouth every day.  Dispense: 90 Capsule; Refill: 1  - Follow-up with PCP for continued management of this condition.    2. Essential hypertension  Chronic, controlled with lisinopril 10 mg daily, no s/e reported, will continue.    - lisinopril (PRINIVIL) 10 MG Tab; Take 1 Tablet by mouth every day.  Dispense: 90 Tablet; Refill: 3  - CBC WITH DIFFERENTIAL; Future  - Comp Metabolic Panel; Future  - MICROALBUMIN CREAT RATIO URINE; Future  -Follow-up with PCP for continued management of this condition.    3. Restlessness  Patient complains of recent development of extremity restlessness at night leading to trouble with sleep initiation.  Triggers unclear.  Possible familial history of Dexter's disease.  We will start a trial of Requip 0.25 mg before bed.  Patient will follow up with myself or PCP if symptoms fail to improve.  - ROPINIRole (REQUIP) 0.25 MG Tab; Take 1 Tablet by mouth at bedtime.  Dispense: 90 Tablet; Refill: 0        Ly JESSICA Booth M.D.      Followup: Return in about 6 months (around 5/18/2023) for follow up " with PCP as directed.    Please note that this dictation was created using voice recognition software. I have made every reasonable attempt to correct obvious errors, but I expect that there are errors of grammar and possibly content that I did not discover before finalizing the note.

## 2023-06-16 ENCOUNTER — OFFICE VISIT (OUTPATIENT)
Dept: URGENT CARE | Facility: PHYSICIAN GROUP | Age: 48
End: 2023-06-16

## 2023-06-16 ENCOUNTER — HOSPITAL ENCOUNTER (OUTPATIENT)
Facility: MEDICAL CENTER | Age: 48
End: 2023-06-16
Attending: NURSE PRACTITIONER
Payer: COMMERCIAL

## 2023-06-16 VITALS
RESPIRATION RATE: 16 BRPM | HEART RATE: 79 BPM | OXYGEN SATURATION: 96 % | HEIGHT: 68 IN | SYSTOLIC BLOOD PRESSURE: 110 MMHG | DIASTOLIC BLOOD PRESSURE: 70 MMHG | TEMPERATURE: 98 F | WEIGHT: 190 LBS | BODY MASS INDEX: 28.79 KG/M2

## 2023-06-16 DIAGNOSIS — N30.01 ACUTE CYSTITIS WITH HEMATURIA: ICD-10-CM

## 2023-06-16 DIAGNOSIS — R39.9 LOWER URINARY TRACT SYMPTOMS (LUTS): ICD-10-CM

## 2023-06-16 LAB
APPEARANCE UR: NORMAL
BILIRUB UR STRIP-MCNC: NEGATIVE MG/DL
COLOR UR AUTO: NORMAL
GLUCOSE UR STRIP.AUTO-MCNC: NEGATIVE MG/DL
KETONES UR STRIP.AUTO-MCNC: NEGATIVE MG/DL
LEUKOCYTE ESTERASE UR QL STRIP.AUTO: NORMAL
NITRITE UR QL STRIP.AUTO: POSITIVE
PH UR STRIP.AUTO: 5 [PH] (ref 5–8)
PROT UR QL STRIP: 30 MG/DL
RBC UR QL AUTO: NORMAL
SP GR UR STRIP.AUTO: 1.02
UROBILINOGEN UR STRIP-MCNC: 0.2 MG/DL

## 2023-06-16 PROCEDURE — 3078F DIAST BP <80 MM HG: CPT | Performed by: NURSE PRACTITIONER

## 2023-06-16 PROCEDURE — 87077 CULTURE AEROBIC IDENTIFY: CPT

## 2023-06-16 PROCEDURE — 87086 URINE CULTURE/COLONY COUNT: CPT

## 2023-06-16 PROCEDURE — 3074F SYST BP LT 130 MM HG: CPT | Performed by: NURSE PRACTITIONER

## 2023-06-16 PROCEDURE — 87186 SC STD MICRODIL/AGAR DIL: CPT

## 2023-06-16 PROCEDURE — 81002 URINALYSIS NONAUTO W/O SCOPE: CPT | Performed by: NURSE PRACTITIONER

## 2023-06-16 PROCEDURE — 99214 OFFICE O/P EST MOD 30 MIN: CPT | Performed by: NURSE PRACTITIONER

## 2023-06-16 RX ORDER — CIPROFLOXACIN 500 MG/1
500 TABLET, FILM COATED ORAL 2 TIMES DAILY
Qty: 14 TABLET | Refills: 3 | Status: SHIPPED | OUTPATIENT
Start: 2023-06-16

## 2023-06-16 ASSESSMENT — ENCOUNTER SYMPTOMS
ABDOMINAL PAIN: 0
VOMITING: 0
BACK PAIN: 1
NAUSEA: 0
DIZZINESS: 0
FLANK PAIN: 1
DIARRHEA: 0
HEADACHES: 0
CHILLS: 0
FEVER: 0

## 2023-06-16 ASSESSMENT — FIBROSIS 4 INDEX: FIB4 SCORE: 0.98

## 2023-06-16 NOTE — PROGRESS NOTES
Subjective     Franklyn Joseph is a 47 y.o. male who presents with UTI (Odor )            HPI  New problem.  Patient is a 47-year-old male with a neurogenic bladder who presents with cloudy, odorous urine.  He denies any frequency, urgency, lower abdominal pain, or blood in his urine.  He does report having low back pain earlier today when he woke up.  He does state that he does catheterize.  He has a history of spinal cord injury sustained to 14 years ago.  He has not taken any medications for this.    Methocarbamol and Zolpidem  Current Outpatient Medications on File Prior to Visit   Medication Sig Dispense Refill    lisinopril (PRINIVIL) 10 MG Tab Take 1 Tablet by mouth every day. 90 Tablet 3    NON SPECIFIED Use  One Urinalysis test strips daily as needed for malodorous urine or urinary retention 10 Each 3    BELBUCA 600 MCG FILM DISSOLVE 1 FILM IN CHEEK TWICE DAILY AS NEEDED. DNFU 2-25-22      baclofen (LIORESAL) 20 MG tablet       NARCAN 4 MG/0.1ML Liquid       oxyCODONE-acetaminophen (PERCOCET-10)  MG Tab       pregabalin (LYRICA) 300 MG capsule        No current facility-administered medications on file prior to visit.     Social History     Socioeconomic History    Marital status:      Spouse name: Not on file    Number of children: Not on file    Years of education: Not on file    Highest education level: 12th grade   Occupational History    Not on file   Tobacco Use    Smoking status: Never    Smokeless tobacco: Current     Types: Chew   Vaping Use    Vaping Use: Never used   Substance and Sexual Activity    Alcohol use: No    Drug use: No    Sexual activity: Not on file   Other Topics Concern    Not on file   Social History Narrative    Not on file     Social Determinants of Health     Financial Resource Strain: Medium Risk (11/15/2022)    Overall Financial Resource Strain (CARDIA)     Difficulty of Paying Living Expenses: Somewhat hard   Food Insecurity: Food Insecurity Present  "(11/15/2022)    Hunger Vital Sign     Worried About Running Out of Food in the Last Year: Sometimes true     Ran Out of Food in the Last Year: Sometimes true   Transportation Needs: No Transportation Needs (11/15/2022)    PRAPARE - Transportation     Lack of Transportation (Medical): No     Lack of Transportation (Non-Medical): No   Physical Activity: Inactive (11/15/2022)    Exercise Vital Sign     Days of Exercise per Week: 0 days     Minutes of Exercise per Session: 0 min   Stress: Stress Concern Present (11/15/2022)    [employer] [employer] - Occupational Stress Questionnaire     Feeling of Stress : Very much   Social Connections: Moderately Integrated (11/15/2022)    Social Connection and Isolation Panel [NHANES]     Frequency of Communication with Friends and Family: More than three times a week     Frequency of Social Gatherings with Friends and Family: Patient refused     Attends Jew Services: Never     Active Member of Clubs or Organizations: Yes     Attends Club or Organization Meetings: 1 to 4 times per year     Marital Status:    Intimate Partner Violence: Not on file   Housing Stability: High Risk (11/15/2022)    Housing Stability Vital Sign     Unable to Pay for Housing in the Last Year: Yes     Number of Places Lived in the Last Year: 1     Unstable Housing in the Last Year: No     Breast Cancer-related family history is not on file.      Review of Systems   Constitutional:  Negative for chills and fever.   Gastrointestinal:  Negative for abdominal pain, diarrhea, nausea and vomiting.   Genitourinary:  Positive for flank pain. Negative for dysuria, frequency, hematuria and urgency.   Musculoskeletal:  Positive for back pain.   Neurological:  Negative for dizziness and headaches.              Objective     /70 (BP Location: Right arm, Patient Position: Sitting, BP Cuff Size: Adult)   Pulse 79   Temp 36.7 °C (98 °F) (Temporal)   Resp 16   Ht 1.727 m (5' 8\")   " Wt 86.2 kg (190 lb)   SpO2 96%   BMI 28.89 kg/m²      Physical Exam  Vitals reviewed.   Constitutional:       General: He is not in acute distress.     Appearance: He is well-developed.   Cardiovascular:      Rate and Rhythm: Normal rate and regular rhythm.      Heart sounds: Normal heart sounds. No murmur heard.  Pulmonary:      Effort: Pulmonary effort is normal. No respiratory distress.      Breath sounds: Normal breath sounds.   Abdominal:      General: Bowel sounds are normal.      Palpations: Abdomen is soft.      Tenderness: There is no abdominal tenderness.   Musculoskeletal:         General: Normal range of motion.      Comments: Moves all 4 extremities normally   Skin:     General: Skin is warm and dry.   Neurological:      Mental Status: He is alert and oriented to person, place, and time.   Psychiatric:         Behavior: Behavior normal.         Thought Content: Thought content normal.                             Assessment & Plan        1. Acute cystitis with hematuria  ciprofloxacin (CIPRO) 500 MG Tab      2. Lower urinary tract symptoms (LUTS)  POCT Urinalysis    URINE CULTURE(NEW)        Patient with neurogenic bladder, self caths at home.  Urine with nitrites, leuks and blood.  Cipro x seven days.  Push fluids.  Differential diagnosis, natural history, supportive care, and indications for immediate follow-up were discussed.

## 2023-06-19 LAB
BACTERIA UR CULT: ABNORMAL
BACTERIA UR CULT: ABNORMAL
SIGNIFICANT IND 70042: ABNORMAL
SITE SITE: ABNORMAL
SOURCE SOURCE: ABNORMAL

## 2024-05-05 ENCOUNTER — OFFICE VISIT (OUTPATIENT)
Dept: URGENT CARE | Facility: PHYSICIAN GROUP | Age: 49
End: 2024-05-05
Payer: COMMERCIAL

## 2024-05-05 ENCOUNTER — HOSPITAL ENCOUNTER (OUTPATIENT)
Facility: MEDICAL CENTER | Age: 49
End: 2024-05-05
Attending: FAMILY MEDICINE
Payer: COMMERCIAL

## 2024-05-05 VITALS
DIASTOLIC BLOOD PRESSURE: 70 MMHG | TEMPERATURE: 97.9 F | RESPIRATION RATE: 14 BRPM | BODY MASS INDEX: 27.86 KG/M2 | OXYGEN SATURATION: 96 % | SYSTOLIC BLOOD PRESSURE: 112 MMHG | WEIGHT: 183.8 LBS | HEART RATE: 76 BPM | HEIGHT: 68 IN

## 2024-05-05 DIAGNOSIS — Z87.440 HISTORY OF RECURRENT UTI (URINARY TRACT INFECTION): ICD-10-CM

## 2024-05-05 DIAGNOSIS — N39.0 URINARY TRACT INFECTION WITHOUT HEMATURIA, SITE UNSPECIFIED: ICD-10-CM

## 2024-05-05 LAB
APPEARANCE UR: NORMAL
BILIRUB UR STRIP-MCNC: NEGATIVE MG/DL
COLOR UR AUTO: YELLOW
GLUCOSE UR STRIP.AUTO-MCNC: NEGATIVE MG/DL
KETONES UR STRIP.AUTO-MCNC: NEGATIVE MG/DL
LEUKOCYTE ESTERASE UR QL STRIP.AUTO: NORMAL
NITRITE UR QL STRIP.AUTO: NEGATIVE
PH UR STRIP.AUTO: 5.5 [PH] (ref 5–8)
PROT UR QL STRIP: NEGATIVE MG/DL
RBC UR QL AUTO: NEGATIVE
SP GR UR STRIP.AUTO: 1.02
UROBILINOGEN UR STRIP-MCNC: 0.2 MG/DL

## 2024-05-05 PROCEDURE — 3078F DIAST BP <80 MM HG: CPT | Performed by: FAMILY MEDICINE

## 2024-05-05 PROCEDURE — 3074F SYST BP LT 130 MM HG: CPT | Performed by: FAMILY MEDICINE

## 2024-05-05 PROCEDURE — 81002 URINALYSIS NONAUTO W/O SCOPE: CPT | Performed by: FAMILY MEDICINE

## 2024-05-05 PROCEDURE — 99213 OFFICE O/P EST LOW 20 MIN: CPT | Performed by: FAMILY MEDICINE

## 2024-05-05 RX ORDER — CEFDINIR 300 MG/1
300 CAPSULE ORAL 2 TIMES DAILY
Qty: 20 CAPSULE | Refills: 2 | Status: SHIPPED | OUTPATIENT
Start: 2024-05-05 | End: 2024-05-12

## 2024-05-05 ASSESSMENT — ENCOUNTER SYMPTOMS
MYALGIAS: 0
FLANK PAIN: 0
FEVER: 0
SHORTNESS OF BREATH: 0
NAUSEA: 0
VOMITING: 0
COUGH: 0
SORE THROAT: 0
CHILLS: 0
ABDOMINAL PAIN: 0

## 2024-05-05 NOTE — PROGRESS NOTES
Subjective:   Aroldo Joseph is a 48 y.o. male who presents for UTI (Cloudy urine x 3 months  )        48-year-old male with history of spinal cord injury, neurogenic bladder presents urgent care with chief complaint of cloudy urine over the past 3 months.  Patient reports history of intermittent recurrent urinary tract infection with similar symptoms previously.  Denies fever chills or sweats denies back pain.  Reports intermittent purulent urethral exudate.  Denies suspicion or contact with sexually transmitted disease.    UTI  This is a new problem. Episode onset: 3 months. The problem occurs intermittently. The problem has been unchanged. Pertinent negatives include no abdominal pain, chills, coughing, fever, myalgias, nausea, rash, sore throat or vomiting. He has tried rest and drinking (Reports previous resolution with antibiotics which required concurrent prescriptions for complete resolution) for the symptoms.     PMH:  has a past medical history of History of tibial fracture (8/13/2021), S/P cholecystectomy (8/13/2021), Spinal cord injury, T7-T12 ([facility]), and Tibia fracture (9/21/2012).    He has no past medical history of Allergy, Asthma, or Cancer ([facility]).  MEDS:   Current Outpatient Medications:     cefdinir (OMNICEF) 300 MG Cap, Take 1 Capsule by mouth 2 times a day for 10 days., Disp: 20 Capsule, Rfl: 2    ciprofloxacin (CIPRO) 500 MG Tab, Take 1 Tablet by mouth 2 times a day., Disp: 14 Tablet, Rfl: 3    lisinopril (PRINIVIL) 10 MG Tab, Take 1 Tablet by mouth every day., Disp: 90 Tablet, Rfl: 3    NON SPECIFIED, Use  One Urinalysis test strips daily as needed for malodorous urine or urinary retention, Disp: 10 Each, Rfl: 3    BELBUCA 600 MCG FILM, DISSOLVE 1 FILM IN CHEEK TWICE DAILY AS NEEDED. DNFU 2-25-22, Disp: , Rfl:     baclofen (LIORESAL) 20 MG tablet, , Disp: , Rfl:     NARCAN 4 MG/0.1ML Liquid, , Disp: , Rfl:     pregabalin (LYRICA) 300 MG capsule, , Disp: , Rfl:     oxyCODONE-acetaminophen  "(PERCOCET-10)  MG Tab, , Disp: , Rfl:   ALLERGIES:   Allergies   Allergen Reactions    Methocarbamol Unspecified     nightmares  nightmares      Zolpidem Unspecified     nightmars  nightmars       SURGHX:   Past Surgical History:   Procedure Laterality Date    CHOLECYSTECTOMY      LAMINOTOMY      OPEN REDUCTION       SOCHX:  reports that he has never smoked. His smokeless tobacco use includes chew. He reports that he does not drink alcohol and does not use drugs.  FH:   Family History   Problem Relation Age of Onset    Stroke Mother     [place]'s disease Mother     Stroke Maternal Grandfather     Diabetes Father     Heart Disease Neg Hx      Review of Systems   Constitutional:  Negative for chills and fever.   HENT:  Negative for sore throat.    Respiratory:  Negative for cough and shortness of breath.    Gastrointestinal:  Negative for abdominal pain, nausea and vomiting.   Genitourinary:  Positive for dysuria. Negative for flank pain, frequency and hematuria.   Musculoskeletal:  Negative for myalgias.   Skin:  Negative for rash.        Objective:   /70 (BP Location: Right arm, Patient Position: Sitting, BP Cuff Size: Adult)   Pulse 76   Temp 36.6 °C (97.9 °F) (Temporal)   Resp 14   Ht 1.727 m (5' 8\")   Wt 83.4 kg (183 lb 12.8 oz)   SpO2 96%   BMI 27.95 kg/m²   Physical Exam  Vitals and nursing note reviewed.   Constitutional:       General: He is not in acute distress.     Appearance: He is well-developed.   HENT:      Head: Normocephalic and atraumatic.      Right Ear: External ear normal.      Left Ear: External ear normal.      Nose: Nose normal.      Mouth/Throat:      Mouth: Mucous membranes are moist.   Eyes:      Conjunctiva/sclera: Conjunctivae normal.   Cardiovascular:      Rate and Rhythm: Normal rate.   Pulmonary:      Effort: Pulmonary effort is normal. No respiratory distress.      Breath sounds: Normal breath sounds.   Abdominal:      General: There is no distension. "   Musculoskeletal:         General: Normal range of motion.   Skin:     General: Skin is warm and dry.   Neurological:      General: No focal deficit present.      Mental Status: He is alert and oriented to person, place, and time. Mental status is at baseline.      Gait: Gait (gait at baseline) normal.   Psychiatric:         Judgment: Judgment normal.           Assessment/Plan:   1. Urinary tract infection without hematuria, site unspecified  - POCT Urinalysis  - Urine Culture; Future  - cefdinir (OMNICEF) 300 MG Cap; Take 1 Capsule by mouth 2 times a day for 10 days.  Dispense: 20 Capsule; Refill: 2    2. History of recurrent UTI (urinary tract infection)  - POCT Urinalysis  - Urine Culture; Future  - cefdinir (OMNICEF) 300 MG Cap; Take 1 Capsule by mouth 2 times a day for 10 days.  Dispense: 20 Capsule; Refill: 2        Medical Decision Making/Course:  In the course of preparing for this visit with review of the pertinent past medical history, recent and past clinic visits including review of previous urine culture from June 2023 and correlating culture and sensitivities, current medications, and performing chart, immunization, medical history and medication reconciliation, and in the further course of obtaining the current history pertinent to the clinic visit today, performing an exam and evaluation, ordering and independently evaluating labs, tests including point-of-care urinalysis and urine culture, and/or procedures, prescribing any recommended new medications as noted above, providing any pertinent counseling and education and recommending further coordination of care, at least  21 minutes of total time were spent during this encounter.      Discussed close monitoring, return precautions, and supportive measures of maintaining adequate fluid hydration and caloric intake, relative rest and symptom management as needed for pain and/or fever.    Differential diagnosis, natural history, supportive care, and  indications for immediate follow-up discussed.     Advised the patient to follow-up with the primary care physician for recheck, reevaluation, and consideration of further management.    Please note that this dictation was created using voice recognition software. I have worked with consultants from the vendor as well as technical experts from [facility] [employer] to optimize the interface. I have made every reasonable attempt to correct obvious errors, but I expect that there are errors of grammar and possibly content that I did not discover before finalizing the note.

## 2024-05-06 DIAGNOSIS — Z87.440 HISTORY OF RECURRENT UTI (URINARY TRACT INFECTION): ICD-10-CM

## 2024-05-06 DIAGNOSIS — N39.0 URINARY TRACT INFECTION WITHOUT HEMATURIA, SITE UNSPECIFIED: ICD-10-CM

## 2024-05-12 RX ORDER — SULFAMETHOXAZOLE AND TRIMETHOPRIM 800; 160 MG/1; MG/1
1 TABLET ORAL 2 TIMES DAILY
Qty: 14 TABLET | Refills: 0 | Status: SHIPPED | OUTPATIENT
Start: 2024-05-12

## 2024-09-13 ENCOUNTER — APPOINTMENT (OUTPATIENT)
Dept: URGENT CARE | Facility: PHYSICIAN GROUP | Age: 49
End: 2024-09-13
Payer: OTHER MISCELLANEOUS

## 2024-11-19 ENCOUNTER — OFFICE VISIT (OUTPATIENT)
Dept: URGENT CARE | Facility: PHYSICIAN GROUP | Age: 49
End: 2024-11-19
Payer: OTHER MISCELLANEOUS

## 2024-11-19 ENCOUNTER — HOSPITAL ENCOUNTER (OUTPATIENT)
Facility: MEDICAL CENTER | Age: 49
End: 2024-11-19
Payer: OTHER MISCELLANEOUS

## 2024-11-19 VITALS
RESPIRATION RATE: 16 BRPM | BODY MASS INDEX: 28.14 KG/M2 | SYSTOLIC BLOOD PRESSURE: 108 MMHG | HEART RATE: 84 BPM | HEIGHT: 69 IN | TEMPERATURE: 96.9 F | DIASTOLIC BLOOD PRESSURE: 70 MMHG | OXYGEN SATURATION: 95 % | WEIGHT: 190 LBS

## 2024-11-19 DIAGNOSIS — R30.0 DYSURIA: ICD-10-CM

## 2024-11-19 DIAGNOSIS — N30.00 ACUTE CYSTITIS WITHOUT HEMATURIA: ICD-10-CM

## 2024-11-19 DIAGNOSIS — R30.0 DYSURIA: Primary | ICD-10-CM

## 2024-11-19 LAB
APPEARANCE UR: NORMAL
BILIRUB UR STRIP-MCNC: NEGATIVE MG/DL
COLOR UR AUTO: NORMAL
GLUCOSE UR STRIP.AUTO-MCNC: NEGATIVE MG/DL
KETONES UR STRIP.AUTO-MCNC: NEGATIVE MG/DL
LEUKOCYTE ESTERASE UR QL STRIP.AUTO: NORMAL
NITRITE UR QL STRIP.AUTO: POSITIVE
PH UR STRIP.AUTO: 5.5 [PH] (ref 5–8)
PROT UR QL STRIP: NEGATIVE MG/DL
RBC UR QL AUTO: NEGATIVE
SP GR UR STRIP.AUTO: 1.01
UROBILINOGEN UR STRIP-MCNC: 0.2 MG/DL

## 2024-11-19 PROCEDURE — 87591 N.GONORRHOEAE DNA AMP PROB: CPT

## 2024-11-19 PROCEDURE — 81002 URINALYSIS NONAUTO W/O SCOPE: CPT

## 2024-11-19 PROCEDURE — 99213 OFFICE O/P EST LOW 20 MIN: CPT

## 2024-11-19 PROCEDURE — 3074F SYST BP LT 130 MM HG: CPT

## 2024-11-19 PROCEDURE — 87491 CHLMYD TRACH DNA AMP PROBE: CPT

## 2024-11-19 PROCEDURE — 87086 URINE CULTURE/COLONY COUNT: CPT

## 2024-11-19 PROCEDURE — 3078F DIAST BP <80 MM HG: CPT

## 2024-11-19 RX ORDER — SULFAMETHOXAZOLE AND TRIMETHOPRIM 800; 160 MG/1; MG/1
1 TABLET ORAL 2 TIMES DAILY
Qty: 20 TABLET | Refills: 0 | Status: SHIPPED | OUTPATIENT
Start: 2024-11-19 | End: 2024-11-29

## 2024-11-20 LAB
C TRACH DNA SPEC QL NAA+PROBE: NEGATIVE
N GONORRHOEA DNA SPEC QL NAA+PROBE: NEGATIVE
SPECIMEN SOURCE: NORMAL

## 2024-11-20 NOTE — PROGRESS NOTES
"Chief Complaint   Patient presents with    UTI     Odor, x3 months          Subjective:   HISTORY OF PRESENT ILLNESS: Aroldo Joseph is a 48 y.o. male who presents for cloudy urine and an odor to his urine for the last 3 months.  Patient has history of neurogenic bladder and he does use self catheterizations.  He reports a history of recurrent UTIs and these are similar to his symptoms past.   Patient denies, chills, flank pain, nausea or vomiting.  He does report on occasion he has some purulent discharge from his urethra.  He denies any possibility of STIs he is in a monogamous relationship with his wife    Medications, Allergies, current problem list, Social and Family history reviewed today in Epic.     Objective:     /70 (BP Location: Right arm, Patient Position: Sitting, BP Cuff Size: Adult)   Pulse 84   Temp 36.1 °C (96.9 °F) (Temporal)   Resp 16   Ht 1.753 m (5' 9\")   Wt 86.2 kg (190 lb)   SpO2 95%     Physical Exam  Vitals reviewed.   Constitutional:       Appearance: Normal appearance.   HENT:      Mouth/Throat:      Mouth: Mucous membranes are moist.   Cardiovascular:      Rate and Rhythm: Normal rate.   Pulmonary:      Effort: Pulmonary effort is normal.   Genitourinary:     Comments: Deferred per patient  Skin:     General: Skin is warm and dry.   Neurological:      Mental Status: He is alert and oriented to person, place, and time.   Psychiatric:         Mood and Affect: Mood normal.          Assessment/Plan:     Diagnosis and associated orders    I personally reviewed prior external notes and test results pertinent to today's visit.     1. Dysuria  POCT Urinalysis    URINE CULTURE(NEW)    Chlamydia/GC, PCR (Urine)    sulfamethoxazole-trimethoprim (BACTRIM DS) 800-160 MG tablet      2. Acute cystitis without hematuria  POCT Urinalysis    URINE CULTURE(NEW)    Chlamydia/GC, PCR (Urine)    sulfamethoxazole-trimethoprim (BACTRIM DS) 800-160 MG tablet        Results for orders placed or " performed in visit on 11/19/24   POCT Urinalysis    Collection Time: 11/19/24  5:51 PM   Result Value Ref Range    POC Color orange Negative    POC Appearance cloudy Negative    POC Glucose negative Negative mg/dL    POC Bilirubin negative Negative mg/dL    POC Ketones negative Negative mg/dL    POC Specific Gravity 1.015 <1.005 - >1.030    POC Blood negative Negative    POC Urine PH 5.5 5.0 - 8.0    POC Protein negative Negative mg/dL    POC Urobiligen 0.2 Negative (0.2) mg/dL    POC Nitrites positive Negative    POC Leukocyte Esterase small Negative        IMPRESSION:  Pt has stable vital signs and no red flag symptoms or exam findings identified.   Informed pt that symptoms are consistent with UTI will culture for sensitivities.  You have also discussed the penile discharge and he is okay with further STD testing. They are discharged home with a course of Bactrim.  At last visit he did have methicillin resistant Staph aureus growing in his urine.  Advised to return with any worsening symptoms    Differential diagnosis discussed. Pt was Educated on red flag symptoms. Pt has been Instructed to return to Urgent Care or nearest Emergency Department if symptoms fail to improve, for any change in condition, further concerns, or new concerning symptoms. Patient states understanding of the plan of care and discharge instructions.  They are discharged in stable condition.         Please note that this dictation was created using voice recognition software. I have made a reasonable attempt to correct obvious errors, but I expect that there are errors of grammar and possibly content that I did not discover before finalizing the note.    This note was electronically signed by LUIS DANIEL Parisi

## 2024-11-22 LAB
BACTERIA UR CULT: NORMAL
SIGNIFICANT IND 70042: NORMAL
SITE SITE: NORMAL
SOURCE SOURCE: NORMAL

## 2024-11-27 ENCOUNTER — APPOINTMENT (OUTPATIENT)
Dept: MEDICAL GROUP | Facility: MEDICAL CENTER | Age: 49
End: 2024-11-27
Payer: OTHER MISCELLANEOUS

## 2025-03-28 ENCOUNTER — HOSPITAL ENCOUNTER (OUTPATIENT)
Facility: MEDICAL CENTER | Age: 50
End: 2025-03-28
Attending: FAMILY MEDICINE
Payer: OTHER MISCELLANEOUS

## 2025-03-28 ENCOUNTER — OFFICE VISIT (OUTPATIENT)
Dept: URGENT CARE | Facility: PHYSICIAN GROUP | Age: 50
End: 2025-03-28
Payer: OTHER MISCELLANEOUS

## 2025-03-28 VITALS
SYSTOLIC BLOOD PRESSURE: 126 MMHG | TEMPERATURE: 97.6 F | BODY MASS INDEX: 29.62 KG/M2 | WEIGHT: 200 LBS | DIASTOLIC BLOOD PRESSURE: 68 MMHG | HEART RATE: 86 BPM | OXYGEN SATURATION: 95 % | HEIGHT: 69 IN | RESPIRATION RATE: 18 BRPM

## 2025-03-28 DIAGNOSIS — R30.0 DYSURIA: ICD-10-CM

## 2025-03-28 DIAGNOSIS — N30.00 ACUTE CYSTITIS WITHOUT HEMATURIA: ICD-10-CM

## 2025-03-28 LAB
APPEARANCE UR: NORMAL
BILIRUB UR STRIP-MCNC: NORMAL MG/DL
COLOR UR AUTO: YELLOW
GLUCOSE UR STRIP.AUTO-MCNC: NORMAL MG/DL
KETONES UR STRIP.AUTO-MCNC: NORMAL MG/DL
LEUKOCYTE ESTERASE UR QL STRIP.AUTO: NORMAL
NITRITE UR QL STRIP.AUTO: NORMAL
PH UR STRIP.AUTO: 5.5 [PH] (ref 5–8)
PROT UR QL STRIP: NORMAL MG/DL
RBC UR QL AUTO: NORMAL
SP GR UR STRIP.AUTO: 1.02
UROBILINOGEN UR STRIP-MCNC: 0.2 MG/DL

## 2025-03-28 PROCEDURE — 87086 URINE CULTURE/COLONY COUNT: CPT

## 2025-03-28 PROCEDURE — 3074F SYST BP LT 130 MM HG: CPT | Performed by: FAMILY MEDICINE

## 2025-03-28 PROCEDURE — 3078F DIAST BP <80 MM HG: CPT | Performed by: FAMILY MEDICINE

## 2025-03-28 PROCEDURE — 81002 URINALYSIS NONAUTO W/O SCOPE: CPT | Performed by: FAMILY MEDICINE

## 2025-03-28 PROCEDURE — 99214 OFFICE O/P EST MOD 30 MIN: CPT | Performed by: FAMILY MEDICINE

## 2025-03-28 RX ORDER — CLINDAMYCIN HYDROCHLORIDE 300 MG/1
300 CAPSULE ORAL 3 TIMES DAILY
Qty: 30 CAPSULE | Refills: 0 | Status: SHIPPED | OUTPATIENT
Start: 2025-03-28 | End: 2025-04-07

## 2025-03-28 RX ORDER — AMITRIPTYLINE HYDROCHLORIDE 10 MG/1
10 TABLET ORAL
COMMUNITY
Start: 2025-03-26

## 2025-03-28 RX ORDER — BISACODYL 5 MG
5 TABLET, DELAYED RELEASE (ENTERIC COATED) ORAL DAILY
COMMUNITY
Start: 2025-01-06

## 2025-03-28 RX ORDER — PHENAZOPYRIDINE HYDROCHLORIDE 200 MG/1
200 TABLET, FILM COATED ORAL 3 TIMES DAILY PRN
Qty: 6 TABLET | Refills: 0 | Status: SHIPPED | OUTPATIENT
Start: 2025-03-28

## 2025-03-28 ASSESSMENT — ENCOUNTER SYMPTOMS
NAUSEA: 0
CONSTITUTIONAL NEGATIVE: 1
RESPIRATORY NEGATIVE: 1
FEVER: 0
CARDIOVASCULAR NEGATIVE: 1
FLANK PAIN: 1
VOMITING: 0

## 2025-03-28 NOTE — PROGRESS NOTES
"Subjective:   Aroldo Joseph is a 49 y.o. male who presents for UTI (Hasn't gotten better since last visit on 11/19/24 )      Neurogenic bladder, self cath.  Uti symptoms, thick discharge, has had meth resistant uti in the past.  He states his symptoms have not gone away.    UTI  Pertinent negatives include no fever, nausea or vomiting.       Review of Systems   Constitutional: Negative.  Negative for fever.   HENT: Negative.     Respiratory: Negative.     Cardiovascular: Negative.    Gastrointestinal:  Negative for nausea and vomiting.   Genitourinary:  Positive for dysuria and flank pain.       Medications, Allergies, and current problem list reviewed today in Epic.     Objective:     /68 (BP Location: Right arm, Patient Position: Sitting, BP Cuff Size: Adult)   Pulse 86   Temp 36.4 °C (97.6 °F) (Temporal)   Resp 18   Ht 1.753 m (5' 9\")   Wt 90.7 kg (200 lb)   SpO2 95%     Physical Exam  Vitals and nursing note reviewed.   Constitutional:       Appearance: Normal appearance.   Cardiovascular:      Rate and Rhythm: Normal rate and regular rhythm.      Pulses: Normal pulses.      Heart sounds: Normal heart sounds.   Pulmonary:      Effort: Pulmonary effort is normal.      Breath sounds: Normal breath sounds.   Abdominal:      General: Abdomen is flat. Bowel sounds are normal. There is no distension.      Palpations: Abdomen is soft. There is no mass.      Tenderness: There is no abdominal tenderness. There is right CVA tenderness. There is no left CVA tenderness, guarding or rebound.      Hernia: No hernia is present.   Neurological:      Mental Status: He is alert.         Assessment/Plan:     Diagnosis and associated orders:     1. Dysuria  POCT Urinalysis    phenazopyridine (PYRIDIUM) 200 MG Tab      2. Acute cystitis without hematuria  URINE CULTURE(NEW)    clindamycin (CLEOCIN) 300 MG Cap         Comments/MDM:     Ua small amount of kady         Differential diagnosis, natural history, " supportive care, and indications for immediate follow-up discussed.    Advised the patient to follow-up with the primary care physician for recheck, reevaluation, and consideration of further management.    Please note that this dictation was created using voice recognition software. I have made a reasonable attempt to correct obvious errors, but I expect that there are errors of grammar and possibly content that I did not discover before finalizing the note.    This note was electronically signed by Robi Piedra M.D.

## 2025-03-31 LAB
BACTERIA UR CULT: NORMAL
SIGNIFICANT IND 70042: NORMAL
SITE SITE: NORMAL
SOURCE SOURCE: NORMAL

## 2025-04-04 ENCOUNTER — APPOINTMENT (OUTPATIENT)
Dept: MEDICAL GROUP | Facility: MEDICAL CENTER | Age: 50
End: 2025-04-04
Payer: OTHER MISCELLANEOUS

## 2025-04-07 ENCOUNTER — HOSPITAL ENCOUNTER (OUTPATIENT)
Facility: MEDICAL CENTER | Age: 50
End: 2025-04-07
Attending: FAMILY MEDICINE
Payer: OTHER MISCELLANEOUS

## 2025-04-07 ENCOUNTER — APPOINTMENT (OUTPATIENT)
Dept: MEDICAL GROUP | Facility: MEDICAL CENTER | Age: 50
End: 2025-04-07
Payer: OTHER MISCELLANEOUS

## 2025-04-07 VITALS
DIASTOLIC BLOOD PRESSURE: 70 MMHG | WEIGHT: 191.8 LBS | SYSTOLIC BLOOD PRESSURE: 124 MMHG | TEMPERATURE: 97.5 F | HEIGHT: 69 IN | HEART RATE: 76 BPM | OXYGEN SATURATION: 96 % | BODY MASS INDEX: 28.41 KG/M2

## 2025-04-07 DIAGNOSIS — F51.04 PSYCHOPHYSIOLOGICAL INSOMNIA: ICD-10-CM

## 2025-04-07 DIAGNOSIS — Z00.00 PE (PHYSICAL EXAM), ANNUAL: ICD-10-CM

## 2025-04-07 DIAGNOSIS — Z87.828 HISTORY OF SPINAL CORD INJURY: ICD-10-CM

## 2025-04-07 DIAGNOSIS — G89.4 CHRONIC PAIN SYNDROME: ICD-10-CM

## 2025-04-07 DIAGNOSIS — N31.9 NEUROGENIC BLADDER: ICD-10-CM

## 2025-04-07 DIAGNOSIS — R36.9 PENILE DISCHARGE: ICD-10-CM

## 2025-04-07 DIAGNOSIS — Z78.9 SELF-CATHETERIZES URINARY BLADDER: ICD-10-CM

## 2025-04-07 DIAGNOSIS — N13.9 URINARY OBSTRUCTION: ICD-10-CM

## 2025-04-07 DIAGNOSIS — S39.012A STRAIN OF LUMBAR REGION, INITIAL ENCOUNTER: ICD-10-CM

## 2025-04-07 PROBLEM — I10 ESSENTIAL HYPERTENSION: Status: RESOLVED | Noted: 2021-11-19 | Resolved: 2025-04-07

## 2025-04-07 PROBLEM — K62.3 RECTAL PROLAPSE: Status: RESOLVED | Noted: 2019-11-30 | Resolved: 2025-04-07

## 2025-04-07 LAB
FORWARD REASON: SPWHY: NORMAL
FORWARD REASON: SPWHY: NORMAL
FORWARDED TO LAB: SPWHR: NORMAL
FORWARDED TO LAB: SPWHR: NORMAL
SPECIMEN SENT: SPWT1: NORMAL
SPECIMEN SENT: SPWT1: NORMAL

## 2025-04-07 PROCEDURE — 99214 OFFICE O/P EST MOD 30 MIN: CPT | Performed by: FAMILY MEDICINE

## 2025-04-07 PROCEDURE — 3074F SYST BP LT 130 MM HG: CPT | Performed by: FAMILY MEDICINE

## 2025-04-07 PROCEDURE — 3078F DIAST BP <80 MM HG: CPT | Performed by: FAMILY MEDICINE

## 2025-04-09 ASSESSMENT — PATIENT HEALTH QUESTIONNAIRE - PHQ9
SUM OF ALL RESPONSES TO PHQ QUESTIONS 1-9: 9
1. LITTLE INTEREST OR PLEASURE IN DOING THINGS: SEVERAL DAYS
7. TROUBLE CONCENTRATING ON THINGS, SUCH AS READING THE NEWSPAPER OR WATCHING TELEVISION: SEVERAL DAYS
5. POOR APPETITE OR OVEREATING: SEVERAL DAYS
SUM OF ALL RESPONSES TO PHQ9 QUESTIONS 1 AND 2: 2
2. FEELING DOWN, DEPRESSED, IRRITABLE, OR HOPELESS: SEVERAL DAYS
4. FEELING TIRED OR HAVING LITTLE ENERGY: SEVERAL DAYS
9. THOUGHTS THAT YOU WOULD BE BETTER OFF DEAD, OR OF HURTING YOURSELF: SEVERAL DAYS
6. FEELING BAD ABOUT YOURSELF - OR THAT YOU ARE A FAILURE OR HAVE LET YOURSELF OR YOUR FAMILY DOWN: SEVERAL DAYS
8. MOVING OR SPEAKING SO SLOWLY THAT OTHER PEOPLE COULD HAVE NOTICED. OR THE OPPOSITE, BEING SO FIGETY OR RESTLESS THAT YOU HAVE BEEN MOVING AROUND A LOT MORE THAN USUAL: SEVERAL DAYS
3. TROUBLE FALLING OR STAYING ASLEEP OR SLEEPING TOO MUCH: SEVERAL DAYS

## 2025-04-09 NOTE — PROGRESS NOTES
Verbal consent was acquired by the patient to use [employer] ambient listening note generation during this visit: YES    CC: penile discharge    Assessment & Plan:     1. Neurogenic bladder  2. Self-catheterizes urinary bladder  3. Penile discharge  4. Urinary obstruction  Chronic penile discharge for over 1 year in 50 yo man with neurogenic bladder requiring daily self-cath. Urinalysis and urine cultures done over the past 6 months were negative. He has been on oral antibiotic twice without any changes in symptoms. He is , but not sexually active due to spinal cord injury. Exam was notable for scant, clear, thin penile discharge. Low clinical concerns for infection. This is likely normal.   - reassurance provided. Patient has not been under the care of urologist for years. Encouraged patient to schedule appointment to establish care with local urologist: Referral to Urology.   - Urinalysis with culture as indicated   - GC/Chlamydia    5. Chronic pain syndrome  6. History of spinal cord injury  Patient suffers chronic pain syndrome from prior spinal cord injury. He is working with pain specialist. He is on chronic Buprenorphine, Oxycodone, and Lyrica. He is concerned about poorly controlled pain and feels that he had better pain control with Methadone. I had long discussion with patient regarding pain management. Due to the lack of insurance, he does not wish to follow up with methadone clinic at this time. Patient was encouraged to establish care with PCP for continued management of chronic medical conditions.     7. Strain of lumbar spine   History and exam concerns for lumbar spines strain leading to minor discomfort. Patient is not too symptomatic. However, this concerns him given ongoing penile discharge as above. Appropriate counseling provided. Conservative treatment recommended and discussed.     Patient requested annual labs, which I ordered.   He will establish care with PCP.     "          Subjective:       HPI:     History of Present Illness  The patient is a 49-year-old male who presents for evaluation of suspected bladder infection.    He has been seeking treatment at an urgent care facility for a recurrent penile discharge which he described as \"pus\" coming out of his penis concerning for bladder infection. He reports occasional development of foul odor and cloudy or bright yellowish appearance of his urine. Denies any fever, chills, nausea, vomiting, hematuria, genial rash. He was seen by urgent care twice over the past 6 months with the most recent visit on 03/28/2025. Urinalysis and urine culture were negative. He was oral antibiotics twice w/o any major changes in symptoms.      He also complains of intermittent pain at the right flank which concerns him for kidney infection. Pain is described as minor ache 1/10 in severity. Lower back pain comes and goes and does not require treatment.     He has history of spinal cord injury in distant past leading to development of neurogenic bladder and bowel. He has been doing self cath twice daily. He has not been seen by urology for 20 years. He does not have PCP. He does follow up regularly with pain management. She does not think his pain is controlled with Buprenorphine, Lyrica, and Oxycodone. He prefers Methadone, but is not ready to seek care at Methadone clinic a this time.     He wishes to have general labs ordered. He is planning to get insurance and establish care with PCP.     He was prescribed amitriptyline last month for sleep which he has not been taking regularly due to concerns of side effects. He tried and did not tolerate Trazodone. He wishes to have BDZ for sleep, but his sleep doctor did not prescribe due to active opioid use.     SOCIAL HISTORY  He works part-time as a .          Current Outpatient Medications:   amitriptyline (ELAVIL) 10 MG Tab, Take 10 mg by mouth at bedtime., Disp: , Rfl:   bisacodyl EC " "(DULCOLAX) 5 MG Tablet Delayed Response, Take 5 mg by mouth every day., Disp: , Rfl:   NON SPECIFIED, Use  One Urinalysis test strips daily as needed for malodorous urine or urinary retention, Disp: 10 Each, Rfl: 3  BELBUCA 600 MCG FILM, DISSOLVE 1 FILM IN CHEEK TWICE DAILY AS NEEDED. DNFU 2-25-22, Disp: , Rfl:   baclofen (LIORESAL) 20 MG tablet, , Disp: , Rfl:   NARCAN 4 MG/0.1ML Liquid, , Disp: , Rfl:   pregabalin (LYRICA) 300 MG capsule, , Disp: , Rfl:      Objective:     Exam:  /70 (BP Location: Right arm, Patient Position: Sitting, BP Cuff Size: Adult long)   Pulse 76   Temp 36.4 °C (97.5 °F) (Temporal)   Ht 1.753 m (5' 9\")   Wt 87 kg (191 lb 12.8 oz)   SpO2 96%   BMI 28.32 kg/m²  Body mass index is 28.32 kg/m².    Constitutional: awake, alert, in no distress.  Skin: Warm, dry, good turgor, no rashes, bruises, ulcers in visible areas.  Eye: conjunctiva clear, lids neg for edema or lesions.  Respiratory: Unlabored respiratory effort, lungs clear to auscultation, no wheezes, no rales.  Cardiovascular: Normal S1, S2, no murmur, no pedal edema.  Psych: Oriented x3, affect and mood wnl, intact judgement and insight.   : scant penile discharge of thin, whitish, clear fluid. Negative genital rash. The rest of genital exam was unremarkable.   MSK: Physical Exam  Musculoskeletal:      Lumbar back: Spasms and tenderness present. No swelling, edema, deformity, signs of trauma, lacerations or bony tenderness. Normal range of motion. Negative right straight leg raise test and negative left straight leg raise test. No scoliosis.      Comments: Mild tenderness to palpation of paraspinal muscles along lumbar spine       Return for As needed.          Please note that this dictation was created using voice recognition software. I have made every reasonable attempt to correct obvious errors, but I expect that there are errors of grammar and possibly content that I did not discover before finalizing the " note.

## 2025-04-11 NOTE — Clinical Note
REFERRAL APPROVAL NOTICE         Sent on April 11, 2025                   Franklyn Joseph  [address]  [facility] [address]                   Dear Mr. Joseph,    After a careful review of the medical information and benefit coverage, [facility] has processed your referral. See below for additional details.    If applicable, you must be actively enrolled with your insurance for coverage of the authorized service. If you have any questions regarding your coverage, please contact your insurance directly.    REFERRAL INFORMATION   Referral #:  [id number]  Referred-To Provider    Referred-By Provider:  Urology    Aziza Booth M.D.   UROLOGY [place] [id number]  [address]  [facility] [address]  [phone] [address]  [facility] [address]  [phone]    Referral Start Date:  04/07/2025  Referral End Date:   04/07/2026             SCHEDULING  If you do not already have an appointment, please call [phone] to make an appointment.     MORE INFORMATION  If you do not already have a [employer] account, sign up at: [email or web address]  You can access your medical information, make appointments, see lab results, billing information, and more.  If you have questions regarding this referral, please contact  the [facility] Referrals department at:             [phone]. Monday - Friday 8:00AM - 5:00PM.     Sincerely,    [facility]

## 2025-04-15 LAB
C TRACH RRNA SPEC QL NAA+PROBE: NORMAL
N GONORRHOEA RRNA SPEC QL NAA+PROBE: NORMAL
REQUEST PROBLEM   100875: NORMAL